# Patient Record
Sex: FEMALE | Race: WHITE | NOT HISPANIC OR LATINO | URBAN - METROPOLITAN AREA
[De-identification: names, ages, dates, MRNs, and addresses within clinical notes are randomized per-mention and may not be internally consistent; named-entity substitution may affect disease eponyms.]

---

## 2017-02-14 ENCOUNTER — OFFICE (AMBULATORY)
Dept: URBAN - METROPOLITAN AREA CLINIC 75 | Facility: CLINIC | Age: 27
End: 2017-02-14

## 2017-02-14 ENCOUNTER — OFFICE (AMBULATORY)
Dept: URBAN - METROPOLITAN AREA CLINIC 75 | Facility: CLINIC | Age: 27
End: 2017-02-14
Payer: MEDICARE

## 2017-02-14 VITALS — HEIGHT: 65 IN

## 2017-02-14 VITALS
HEART RATE: 100 BPM | HEIGHT: 65 IN | DIASTOLIC BLOOD PRESSURE: 78 MMHG | SYSTOLIC BLOOD PRESSURE: 122 MMHG | WEIGHT: 157 LBS

## 2017-02-14 DIAGNOSIS — K63.3 ULCER OF INTESTINE: ICD-10-CM

## 2017-02-14 DIAGNOSIS — T81.4XXS INFECTION FOLLOWING A PROCEDURE, SEQUELA: ICD-10-CM

## 2017-02-14 DIAGNOSIS — K50.00 CROHN'S DISEASE OF SMALL INTESTINE WITHOUT COMPLICATIONS: ICD-10-CM

## 2017-02-14 DIAGNOSIS — R53.83 OTHER FATIGUE: ICD-10-CM

## 2017-02-14 DIAGNOSIS — Z00.00 ENCOUNTER FOR GENERAL ADULT MEDICAL EXAMINATION WITHOUT ABNO: ICD-10-CM

## 2017-02-14 DIAGNOSIS — R10.10 UPPER ABDOMINAL PAIN, UNSPECIFIED: ICD-10-CM

## 2017-02-14 DIAGNOSIS — Z79.899 OTHER LONG TERM (CURRENT) DRUG THERAPY: ICD-10-CM

## 2017-02-14 DIAGNOSIS — R11.0 NAUSEA: ICD-10-CM

## 2017-02-14 DIAGNOSIS — K50.013 CROHN'S DISEASE OF SMALL INTESTINE WITH FISTULA: ICD-10-CM

## 2017-02-14 DIAGNOSIS — Z92.25 PERSONAL HISTORY OF IMMUNOSUPPRESSION THERAPY: ICD-10-CM

## 2017-02-14 DIAGNOSIS — R14.0 ABDOMINAL DISTENSION (GASEOUS): ICD-10-CM

## 2017-02-14 PROCEDURE — 99214 OFFICE O/P EST MOD 30 MIN: CPT | Performed by: INTERNAL MEDICINE

## 2017-02-14 RX ORDER — VEDOLIZUMAB 300 MG/5ML
INJECTION, POWDER, LYOPHILIZED, FOR SOLUTION INTRAVENOUS
Qty: 1 | Refills: 3 | Status: COMPLETED
End: 2018-06-05

## 2017-02-14 RX ORDER — CYANOCOBALAMIN 1000 UG/ML
INJECTION, SOLUTION INTRAMUSCULAR
Qty: 6 | Refills: 6 | Status: ACTIVE

## 2017-07-03 ENCOUNTER — OFFICE (AMBULATORY)
Dept: URBAN - METROPOLITAN AREA CLINIC 75 | Facility: CLINIC | Age: 27
End: 2017-07-03

## 2017-07-03 VITALS
DIASTOLIC BLOOD PRESSURE: 62 MMHG | SYSTOLIC BLOOD PRESSURE: 102 MMHG | WEIGHT: 165 LBS | HEIGHT: 65 IN | HEART RATE: 97 BPM

## 2017-07-03 DIAGNOSIS — R14.0 ABDOMINAL DISTENSION (GASEOUS): ICD-10-CM

## 2017-07-03 DIAGNOSIS — K50.013 CROHN'S DISEASE OF SMALL INTESTINE WITH FISTULA: ICD-10-CM

## 2017-07-03 DIAGNOSIS — R10.10 UPPER ABDOMINAL PAIN, UNSPECIFIED: ICD-10-CM

## 2017-07-03 DIAGNOSIS — R11.0 NAUSEA: ICD-10-CM

## 2017-07-03 DIAGNOSIS — D51.9 VITAMIN B12 DEFICIENCY ANEMIA, UNSPECIFIED: ICD-10-CM

## 2017-07-03 DIAGNOSIS — K63.3 ULCER OF INTESTINE: ICD-10-CM

## 2017-07-03 DIAGNOSIS — R53.83 OTHER FATIGUE: ICD-10-CM

## 2017-07-03 DIAGNOSIS — K59.1 FUNCTIONAL DIARRHEA: ICD-10-CM

## 2017-07-03 DIAGNOSIS — Z92.25 PERSONAL HISTORY OF IMMUNOSUPPRESSION THERAPY: ICD-10-CM

## 2017-07-03 DIAGNOSIS — K50.90 CROHN'S DISEASE, UNSPECIFIED, WITHOUT COMPLICATIONS: ICD-10-CM

## 2017-07-03 PROCEDURE — 99215 OFFICE O/P EST HI 40 MIN: CPT | Performed by: INTERNAL MEDICINE

## 2017-07-03 NOTE — SERVICEHPINOTES
08/03/16... She'll return for followup. She's a quite complex case which included intra-abdominal abscess, drainage of abscess and millimeter stomata the left lower quadrant. Since she has lost 40 pounds she's having some difficulty with the stoma it may need to be revised again. She is having increased transit and notes of her output is greenish in color. She is having difficulty with loose bowel movements and uses a large amount of Imodium, sometimes much as 20 per day. I did reassure them as this was okay. She has been previously on hydrocodone and large amounts and states that she's not using this or fentanyl patch is a longer. She is off of prednisone as well. She denies any nausea or vomiting at this time she is able to eat. She does have some bloating and abdominal pain in the lower abdomen along her incision. Surgery and followup in done by . Records have been reviewed.Pertinent positive symptoms include fatigue, loss of appetite, weight loss, abdominal pain, bloating, change in bowel habits, painful stools. 11/1/16... We have followed the patient for Crohns ileocolitis. Disease course has been flaring since last visit. Currently on treatment with Entyvio and Prednisone. Baseline IBD symptoms have worsened since last visit. The patient has not been doing well overall. Currently having bowel movement(s) per day. Active GI symptoms include normally functioning ileostomy but it appears to be a possible fistula just lateral to the stoma which is now apparently passing stool continuously and bubbling. They are considered by the patient to be severe in nature. Alarm symptoms reported: fever/chills. The patient also reports the following potential extraintestinal symptom manifestations: apparent fistula or continued peristomal abscess medially. Symptoms have recently caused the patient to take pain medication and we had a discussion about the fact that pain medication not going to resolve her symptoms as a have been over used in the past and we are going to try and use medication to healed fistulous tract.. [facility] review shows that she is filling Hydrocodone regularly most recently with Dr. Banegas and Dr. Steinberg.2/14/17..... Liliana returns for followup. Overall she's doing reasonably well and we had a long discussion about her B12 injections. Levels have been fairly high greater than 1000, with celiac disease and monthly but I recommended that she continue this for the time being and that we mightBRShe while in the interval in the future. Further big issue is that she has continued drainage from the fistulous tract in the inferomedial position from her stoma. She is concerned because Dr. Jay recommended surgery which will require moving her stoma to the right side. This is in hopes that the fistula can be resolved. We also discussed the fairly real therapy available would be to increase her Entyvio dosing. We have followed the patient for Crohns ileocolitis. Disease course has been stable on biologic therapy with immunomodulators but still with fistulous drainage near the stoma on the right. Currently on treatment with Entyvio. Baseline IBD symptoms have remain the same since last visit. The patient has been improving overall. Currently having bowel movement(s) per day. Active GI symptoms include LLQ pain referable to her stoma and the fistula. They are considered by the patient to be mild in nature. Alarm symptoms reported: none. The patient also reports the following potential extraintestinal symptom manifestations: none. Symptoms have recently caused the patient to change nothing. she is currently also taking codeine 3 times daily to thicken in her bowel movements with a reasonable response7/3/17... We have followed the patient for   Crohns ileocolitis  . Disease course has been   stable on biologic therapy  . Currently on treatment with   Entyvio every 4 weeks (increased interval starting in April)  . Baseline IBD symptoms have   remain the same  since last visit. The patient      has been doing fair  overall. Active GI symptoms include   LLQ pain and increased output  . They are considered by the patient to be   mild   in nature. Alarm symptoms reported:   none  . The patient also reports the following potential extraintestinal symptom manifestations:   none  . Symptoms have recently caused the patient to   file for disability  . She did have her stoma moved in March and she is doing better since then. She did have some setbacks immediately after the surgery. She c/o sharp LLQ pain which is worse with stress and anxiety but no change with food or output. She c/o excessive output from her ostomy and has tried loperamide (10+ a day) and codeine BID which hasn't really help. She states that Dr. Jay wanted some stool studies and asks us to do this. She also c/o sweats/fatigue x 1 year and wonders what she can do about that. She also asks about seeing a nutritionist as she eats mostly carbs as this is what helps her output however it is causing weight gain. Her weight gain concerns her as she is fearful about developing another hernia. 5/18/17 labs: CMP ALT 87, AST 43 CBC H/H 13.4/43.6.  6/2017 labs: B12 619 CMP nml Mg 1.8 Phos 3.2

## 2017-07-03 NOTE — SERVICENOTES
The above note was scribed by Dee Dee Razo PA-C. Dr. Keyes saw this patient and examined this patient while in the office.

## 2017-12-15 ENCOUNTER — APPOINTMENT (OUTPATIENT)
Dept: CARDIOLOGY | Facility: HOSPITAL | Age: 27
End: 2017-12-15

## 2017-12-15 ENCOUNTER — HOSPITAL ENCOUNTER (EMERGENCY)
Facility: HOSPITAL | Age: 27
Discharge: HOME | End: 2017-12-15
Attending: EMERGENCY MEDICINE | Admitting: EMERGENCY MEDICINE

## 2017-12-15 VITALS
HEART RATE: 109 BPM | TEMPERATURE: 98.4 F | DIASTOLIC BLOOD PRESSURE: 86 MMHG | HEIGHT: 65 IN | SYSTOLIC BLOOD PRESSURE: 140 MMHG | RESPIRATION RATE: 18 BRPM | OXYGEN SATURATION: 96 % | BODY MASS INDEX: 28.32 KG/M2 | WEIGHT: 170 LBS

## 2017-12-15 DIAGNOSIS — N39.0 ACUTE UTI: Primary | ICD-10-CM

## 2017-12-15 DIAGNOSIS — M79.10 MYALGIA: ICD-10-CM

## 2017-12-15 DIAGNOSIS — E86.0 MILD DEHYDRATION: ICD-10-CM

## 2017-12-15 LAB
ALBUMIN SERPL-MCNC: 4.7 G/DL (ref 3.5–5.2)
ALBUMIN/GLOB SERPL: 1.2 G/DL
ALP SERPL-CCNC: 108 U/L (ref 39–117)
ALT SERPL W P-5'-P-CCNC: 70 U/L (ref 1–33)
ANION GAP SERPL CALCULATED.3IONS-SCNC: 13.7 MMOL/L
AST SERPL-CCNC: 27 U/L (ref 1–32)
BACTERIA UR QL AUTO: ABNORMAL /HPF
BASOPHILS # BLD AUTO: 0.03 10*3/MM3 (ref 0–0.2)
BASOPHILS NFR BLD AUTO: 0.2 % (ref 0–1.5)
BH CV LOWER VASCULAR LEFT COMMON FEMORAL AUGMENT: NORMAL
BH CV LOWER VASCULAR LEFT COMMON FEMORAL COMPETENT: NORMAL
BH CV LOWER VASCULAR LEFT COMMON FEMORAL COMPRESS: NORMAL
BH CV LOWER VASCULAR LEFT COMMON FEMORAL PHASIC: NORMAL
BH CV LOWER VASCULAR LEFT COMMON FEMORAL SPONT: NORMAL
BH CV LOWER VASCULAR LEFT DISTAL FEMORAL COMPRESS: NORMAL
BH CV LOWER VASCULAR LEFT GASTRONEMIUS COMPRESS: NORMAL
BH CV LOWER VASCULAR LEFT GREATER SAPH AK COMPRESS: NORMAL
BH CV LOWER VASCULAR LEFT GREATER SAPH BK COMPRESS: NORMAL
BH CV LOWER VASCULAR LEFT LESSER SAPH COMPRESS: NORMAL
BH CV LOWER VASCULAR LEFT MID FEMORAL AUGMENT: NORMAL
BH CV LOWER VASCULAR LEFT MID FEMORAL COMPETENT: NORMAL
BH CV LOWER VASCULAR LEFT MID FEMORAL COMPRESS: NORMAL
BH CV LOWER VASCULAR LEFT MID FEMORAL PHASIC: NORMAL
BH CV LOWER VASCULAR LEFT MID FEMORAL SPONT: NORMAL
BH CV LOWER VASCULAR LEFT PERONEAL COMPRESS: NORMAL
BH CV LOWER VASCULAR LEFT POPLITEAL AUGMENT: NORMAL
BH CV LOWER VASCULAR LEFT POPLITEAL COMPETENT: NORMAL
BH CV LOWER VASCULAR LEFT POPLITEAL COMPRESS: NORMAL
BH CV LOWER VASCULAR LEFT POPLITEAL PHASIC: NORMAL
BH CV LOWER VASCULAR LEFT POPLITEAL SPONT: NORMAL
BH CV LOWER VASCULAR LEFT POSTERIOR TIBIAL COMPRESS: NORMAL
BH CV LOWER VASCULAR LEFT PROXIMAL FEMORAL COMPRESS: NORMAL
BH CV LOWER VASCULAR LEFT SAPHENOFEMORAL JUNCTION AUGMENT: NORMAL
BH CV LOWER VASCULAR LEFT SAPHENOFEMORAL JUNCTION COMPRESS: NORMAL
BH CV LOWER VASCULAR LEFT SAPHENOFEMORAL JUNCTION PHASIC: NORMAL
BH CV LOWER VASCULAR LEFT SAPHENOFEMORAL JUNCTION SPONT: NORMAL
BH CV LOWER VASCULAR RIGHT COMMON FEMORAL AUGMENT: NORMAL
BH CV LOWER VASCULAR RIGHT COMMON FEMORAL COMPETENT: NORMAL
BH CV LOWER VASCULAR RIGHT COMMON FEMORAL COMPRESS: NORMAL
BH CV LOWER VASCULAR RIGHT COMMON FEMORAL PHASIC: NORMAL
BH CV LOWER VASCULAR RIGHT COMMON FEMORAL SPONT: NORMAL
BH CV LOWER VASCULAR RIGHT DISTAL FEMORAL COMPRESS: NORMAL
BH CV LOWER VASCULAR RIGHT GASTRONEMIUS COMPRESS: NORMAL
BH CV LOWER VASCULAR RIGHT GREATER SAPH AK COMPRESS: NORMAL
BH CV LOWER VASCULAR RIGHT GREATER SAPH BK COMPRESS: NORMAL
BH CV LOWER VASCULAR RIGHT LESSER SAPH COMPRESS: NORMAL
BH CV LOWER VASCULAR RIGHT MID FEMORAL AUGMENT: NORMAL
BH CV LOWER VASCULAR RIGHT MID FEMORAL COMPETENT: NORMAL
BH CV LOWER VASCULAR RIGHT MID FEMORAL COMPRESS: NORMAL
BH CV LOWER VASCULAR RIGHT MID FEMORAL PHASIC: NORMAL
BH CV LOWER VASCULAR RIGHT MID FEMORAL SPONT: NORMAL
BH CV LOWER VASCULAR RIGHT PERONEAL COMPRESS: NORMAL
BH CV LOWER VASCULAR RIGHT POPLITEAL AUGMENT: NORMAL
BH CV LOWER VASCULAR RIGHT POPLITEAL COMPETENT: NORMAL
BH CV LOWER VASCULAR RIGHT POPLITEAL COMPRESS: NORMAL
BH CV LOWER VASCULAR RIGHT POPLITEAL PHASIC: NORMAL
BH CV LOWER VASCULAR RIGHT POPLITEAL SPONT: NORMAL
BH CV LOWER VASCULAR RIGHT POSTERIOR TIBIAL COMPRESS: NORMAL
BH CV LOWER VASCULAR RIGHT PROXIMAL FEMORAL COMPRESS: NORMAL
BH CV LOWER VASCULAR RIGHT SAPHENOFEMORAL JUNCTION AUGMENT: NORMAL
BH CV LOWER VASCULAR RIGHT SAPHENOFEMORAL JUNCTION COMPRESS: NORMAL
BH CV LOWER VASCULAR RIGHT SAPHENOFEMORAL JUNCTION PHASIC: NORMAL
BH CV LOWER VASCULAR RIGHT SAPHENOFEMORAL JUNCTION SPONT: NORMAL
BILIRUB SERPL-MCNC: 0.4 MG/DL (ref 0.1–1.2)
BILIRUB UR QL STRIP: NEGATIVE
BUN BLD-MCNC: 9 MG/DL (ref 6–20)
BUN/CREAT SERPL: 12.3 (ref 7–25)
CALCIUM SPEC-SCNC: 9.9 MG/DL (ref 8.6–10.5)
CHLORIDE SERPL-SCNC: 103 MMOL/L (ref 98–107)
CK SERPL-CCNC: 21 U/L (ref 20–180)
CLARITY UR: ABNORMAL
CO2 SERPL-SCNC: 26.3 MMOL/L (ref 22–29)
COLOR UR: YELLOW
CREAT BLD-MCNC: 0.73 MG/DL (ref 0.57–1)
DEPRECATED RDW RBC AUTO: 51.4 FL (ref 37–54)
EOSINOPHIL # BLD AUTO: 0.12 10*3/MM3 (ref 0–0.7)
EOSINOPHIL NFR BLD AUTO: 0.9 % (ref 0.3–6.2)
ERYTHROCYTE [DISTWIDTH] IN BLOOD BY AUTOMATED COUNT: 15.1 % (ref 11.7–13)
GFR SERPL CREATININE-BSD FRML MDRD: 96 ML/MIN/1.73
GLOBULIN UR ELPH-MCNC: 4 GM/DL
GLUCOSE BLD-MCNC: 78 MG/DL (ref 65–99)
GLUCOSE UR STRIP-MCNC: NEGATIVE MG/DL
HCT VFR BLD AUTO: 52.1 % (ref 35.6–45.5)
HGB BLD-MCNC: 16.2 G/DL (ref 11.9–15.5)
HGB UR QL STRIP.AUTO: NEGATIVE
HYALINE CASTS UR QL AUTO: ABNORMAL /LPF
IMM GRANULOCYTES # BLD: 0.08 10*3/MM3 (ref 0–0.03)
IMM GRANULOCYTES NFR BLD: 0.6 % (ref 0–0.5)
KETONES UR QL STRIP: NEGATIVE
LEUKOCYTE ESTERASE UR QL STRIP.AUTO: ABNORMAL
LYMPHOCYTES # BLD AUTO: 3.09 10*3/MM3 (ref 0.9–4.8)
LYMPHOCYTES NFR BLD AUTO: 23.6 % (ref 19.6–45.3)
MCH RBC QN AUTO: 29 PG (ref 26.9–32)
MCHC RBC AUTO-ENTMCNC: 31.1 G/DL (ref 32.4–36.3)
MCV RBC AUTO: 93.4 FL (ref 80.5–98.2)
MONOCYTES # BLD AUTO: 1.55 10*3/MM3 (ref 0.2–1.2)
MONOCYTES NFR BLD AUTO: 11.8 % (ref 5–12)
NEUTROPHILS # BLD AUTO: 8.24 10*3/MM3 (ref 1.9–8.1)
NEUTROPHILS NFR BLD AUTO: 62.9 % (ref 42.7–76)
NITRITE UR QL STRIP: NEGATIVE
PH UR STRIP.AUTO: 5.5 [PH] (ref 5–8)
PLATELET # BLD AUTO: 341 10*3/MM3 (ref 140–500)
PMV BLD AUTO: 9.2 FL (ref 6–12)
POTASSIUM BLD-SCNC: 3.6 MMOL/L (ref 3.5–5.2)
PROT SERPL-MCNC: 8.7 G/DL (ref 6–8.5)
PROT UR QL STRIP: NEGATIVE
RBC # BLD AUTO: 5.58 10*6/MM3 (ref 3.9–5.2)
RBC # UR: ABNORMAL /HPF
REF LAB TEST METHOD: ABNORMAL
SODIUM BLD-SCNC: 143 MMOL/L (ref 136–145)
SP GR UR STRIP: 1.01 (ref 1–1.03)
SQUAMOUS #/AREA URNS HPF: ABNORMAL /HPF
UROBILINOGEN UR QL STRIP: ABNORMAL
WBC NRBC COR # BLD: 13.11 10*3/MM3 (ref 4.5–10.7)
WBC UR QL AUTO: ABNORMAL /HPF

## 2017-12-15 PROCEDURE — 96375 TX/PRO/DX INJ NEW DRUG ADDON: CPT

## 2017-12-15 PROCEDURE — 81001 URINALYSIS AUTO W/SCOPE: CPT | Performed by: NURSE PRACTITIONER

## 2017-12-15 PROCEDURE — 25010000002 CEFTRIAXONE PER 250 MG: Performed by: NURSE PRACTITIONER

## 2017-12-15 PROCEDURE — 96366 THER/PROPH/DIAG IV INF ADDON: CPT

## 2017-12-15 PROCEDURE — 80053 COMPREHEN METABOLIC PANEL: CPT | Performed by: NURSE PRACTITIONER

## 2017-12-15 PROCEDURE — 82550 ASSAY OF CK (CPK): CPT | Performed by: NURSE PRACTITIONER

## 2017-12-15 PROCEDURE — 96365 THER/PROPH/DIAG IV INF INIT: CPT

## 2017-12-15 PROCEDURE — 99284 EMERGENCY DEPT VISIT MOD MDM: CPT

## 2017-12-15 PROCEDURE — 85025 COMPLETE CBC W/AUTO DIFF WBC: CPT | Performed by: NURSE PRACTITIONER

## 2017-12-15 PROCEDURE — 93970 EXTREMITY STUDY: CPT

## 2017-12-15 PROCEDURE — 87086 URINE CULTURE/COLONY COUNT: CPT | Performed by: NURSE PRACTITIONER

## 2017-12-15 PROCEDURE — 25010000002 HYDROMORPHONE PER 4 MG: Performed by: NURSE PRACTITIONER

## 2017-12-15 RX ORDER — CEFTRIAXONE SODIUM 1 G/50ML
1 INJECTION, SOLUTION INTRAVENOUS ONCE
Status: COMPLETED | OUTPATIENT
Start: 2017-12-15 | End: 2017-12-15

## 2017-12-15 RX ORDER — OXYCODONE HYDROCHLORIDE AND ACETAMINOPHEN 5; 325 MG/1; MG/1
1 TABLET ORAL ONCE
Status: COMPLETED | OUTPATIENT
Start: 2017-12-15 | End: 2017-12-15

## 2017-12-15 RX ORDER — PREDNISONE MICRONIZED 100 %
POWDER (GRAM) MISCELLANEOUS
COMMUNITY
End: 2019-07-23

## 2017-12-15 RX ORDER — ONDANSETRON 4 MG/1
4 TABLET, ORALLY DISINTEGRATING ORAL EVERY 8 HOURS PRN
COMMUNITY

## 2017-12-15 RX ORDER — ALPRAZOLAM 0.25 MG/1
0.25 TABLET ORAL 3 TIMES DAILY PRN
COMMUNITY
End: 2019-07-23

## 2017-12-15 RX ORDER — ONDANSETRON 4 MG/1
4 TABLET, ORALLY DISINTEGRATING ORAL ONCE
Status: COMPLETED | OUTPATIENT
Start: 2017-12-15 | End: 2017-12-15

## 2017-12-15 RX ORDER — SODIUM CHLORIDE 0.9 % (FLUSH) 0.9 %
10 SYRINGE (ML) INJECTION AS NEEDED
Status: DISCONTINUED | OUTPATIENT
Start: 2017-12-15 | End: 2017-12-15 | Stop reason: HOSPADM

## 2017-12-15 RX ORDER — CEPHALEXIN 500 MG/1
500 CAPSULE ORAL 4 TIMES DAILY
Qty: 20 CAPSULE | Refills: 0 | Status: SHIPPED | OUTPATIENT
Start: 2017-12-15 | End: 2019-07-23

## 2017-12-15 RX ORDER — FERROUS SULFATE 325(65) MG
325 TABLET ORAL
COMMUNITY

## 2017-12-15 RX ORDER — CETIRIZINE HYDROCHLORIDE 10 MG/1
10 TABLET ORAL DAILY
COMMUNITY
End: 2019-07-23 | Stop reason: SDUPTHER

## 2017-12-15 RX ORDER — HYDROMORPHONE HYDROCHLORIDE 1 MG/ML
0.5 INJECTION, SOLUTION INTRAMUSCULAR; INTRAVENOUS; SUBCUTANEOUS ONCE
Status: COMPLETED | OUTPATIENT
Start: 2017-12-15 | End: 2017-12-15

## 2017-12-15 RX ADMIN — OXYCODONE HYDROCHLORIDE AND ACETAMINOPHEN 1 TABLET: 5; 325 TABLET ORAL at 11:20

## 2017-12-15 RX ADMIN — HYDROMORPHONE HYDROCHLORIDE 0.5 MG: 1 INJECTION, SOLUTION INTRAMUSCULAR; INTRAVENOUS; SUBCUTANEOUS at 14:29

## 2017-12-15 RX ADMIN — CEFTRIAXONE SODIUM 1 G: 1 INJECTION, SOLUTION INTRAVENOUS at 12:14

## 2017-12-15 RX ADMIN — SODIUM CHLORIDE 1000 ML: 9 INJECTION, SOLUTION INTRAVENOUS at 12:14

## 2017-12-15 RX ADMIN — ONDANSETRON 4 MG: 4 TABLET, ORALLY DISINTEGRATING ORAL at 11:19

## 2017-12-15 NOTE — ED PROVIDER NOTES
I supervised care provided by the midlevel provider.    We have discussed this patient's history, physical exam, and treatment plan.   I have reviewed the note and personally saw and examined the patient and agree with the plan of care.    Pt reports that she has h/o Crohn's Disease for which pt has an ostomy in place. 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 Pt states that about 4-5 days ago, pt developed myalgias of the BLEs. Pt states that she is concerned that her myalgias are an adverse reaction to the steroid injections. Pt denies documented fever, dyspnea, chest pain, and BLE weakness/numbness. On physical exam, there is mild tenderness to the calves bilaterally. There is no swelling of the calves bilaterally. Pt has been administered Percocet and Zofran in the ER. BLE venous dopplers are negative for DVT. Pt was discharged and was advised to f/u with general surgeon.                   Documentation assistance provided by Augusta Weiner. Information recorded by the scribe was done at my direction and has been verified and validated by me.     Entered by Augusta Weiner, acting as scribe for Dr. Amelia MD.             Augusta Weiner  12/15/17 2317       Tristan Cisneros MD  12/15/17 2729

## 2017-12-15 NOTE — DISCHARGE INSTRUCTIONS
Continue current home medications  Increase fluids  Activity as tolerated  Warm compresses to legs  Follow up with PMD and Dr Lares next week  Return to er for fever, chills, chest pain, shortness of air, redness or swelling to legs, increased pain or any new or worsening symptoms

## 2017-12-15 NOTE — ED PROVIDER NOTES
EMERGENCY DEPARTMENT ENCOUNTER    CHIEF COMPLAINT  Chief Complaint: myalgias   History given by: patient  History limited by: N/A  Room Number: 25/25  PMD: Provider Not In System   General surgeon- Dr Lares       HPI:  Pt is a 27 y.o. female who presents with myalgias. She states that she has Crohn's disease with ileostomy in place. 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 For approximately the last 4 days, she has had BLE pain that is exacerbated by movement. She denies recent injury or trauma, sensory loss, motor loss, pain and difficulty with urination, back pain, fevers, chills, cough, N/V/D, abd pain, chest pain, and trouble breathing. She is concerned that her sx could be due to adverse reaction from receiving the prednisone injections. Pt notes that she has been taking tylenol for pain without significant relief. She also reports that she has not had increased drainage from her ileostomy and has no complaints about her pyoderma gangrenosum or Crohn's disease. Past Medical History of Crohn's disease, chronic pain syndrome, and anxiety.      Duration: for about 4 days   Timing: intermittent  Location: BLE  Radiation: none  Quality: pain  Intensity/Severity: moderate  Progression: unchanged  Associated Symptoms: BLE pain  Aggravating Factors: movement  Alleviating Factors: none  Previous Episodes: none mentioned  Treatment before arrival: Pt reports that she has been taking tylenol for pain without significant relief.     PAST MEDICAL HISTORY  Active Ambulatory Problems     Diagnosis Date Noted   • No Active Ambulatory Problems     Resolved Ambulatory Problems     Diagnosis Date Noted   • No Resolved Ambulatory Problems     Past Medical History:   Diagnosis Date   • Anxiety    • Crohn's disease        PAST SURGICAL HISTORY  History reviewed. No pertinent surgical  history.    FAMILY HISTORY  History reviewed. No pertinent family history.    SOCIAL HISTORY  Social History     Social History   • Marital status: Single     Spouse name: N/A   • Number of children: N/A   • Years of education: N/A     Occupational History   • Not on file.     Social History Main Topics   • Smoking status: Never Smoker   • Smokeless tobacco: Not on file   • Alcohol use No   • Drug use: Not on file   • Sexual activity: Not on file     Other Topics Concern   • Not on file     Social History Narrative   • No narrative on file         ALLERGIES  Bentyl [dicyclomine hcl]; Ciprofloxacin; Flagyl [metronidazole]; Morphine and related; Mycophenolate; Pepcid [famotidine]; Phenergan [promethazine hcl]; Reglan [metoclopramide]; and Sulfa antibiotics    REVIEW OF SYSTEMS  Review of Systems   Constitutional: Negative for chills and fever.   HENT: Negative for sore throat.    Respiratory: Negative for cough and shortness of breath.    Cardiovascular: Negative for chest pain.   Gastrointestinal: Negative for abdominal pain, diarrhea, nausea and vomiting.   Genitourinary: Negative for difficulty urinating and dysuria.   Musculoskeletal: Positive for myalgias (BLE pain). Negative for back pain.   Skin: Negative for rash.   Neurological: Negative for dizziness, weakness and numbness.   Psychiatric/Behavioral: The patient is not nervous/anxious.        PHYSICAL EXAM  ED Triage Vitals   Temp Heart Rate Resp BP SpO2   12/15/17 1033 12/15/17 1033 12/15/17 1033 12/15/17 1043 12/15/17 1033   98.4 °F (36.9 °C) 108 18 148/113 99 % WNL       Physical Exam   Constitutional: She is oriented to person, place, and time and well-developed, well-nourished, and in no distress.   HENT:   Head: Normocephalic.   Mouth/Throat: Mucous membranes are normal.   Eyes: No scleral icterus.   Neck: Normal range of motion.   Cardiovascular: Normal rate, regular rhythm and normal heart sounds.    Pulses:       Dorsalis pedis pulses are 2+ on the  right side, and 2+ on the left side.        Posterior tibial pulses are 2+ on the right side, and 2+ on the left side.   Pulmonary/Chest: Effort normal and breath sounds normal. No respiratory distress.   Abdominal: Soft. There is no tenderness. There is no rebound and no guarding.   Ileostomy present in right abdomen    Musculoskeletal: Normal range of motion. She exhibits no edema (no pedal edema).   BLE tenderness, NV intact distally to BLE   Neurological: She is alert and oriented to person, place, and time. She has normal motor skills and normal sensation.   Skin: Skin is warm and dry.   Psychiatric: Her mood appears anxious.   Nursing note and vitals reviewed.      LAB RESULTS  Recent Results (from the past 24 hour(s))   Comprehensive Metabolic Panel    Collection Time: 12/15/17 11:06 AM   Result Value Ref Range    Glucose 78 65 - 99 mg/dL    BUN 9 6 - 20 mg/dL    Creatinine 0.73 0.57 - 1.00 mg/dL    Sodium 143 136 - 145 mmol/L    Potassium 3.6 3.5 - 5.2 mmol/L    Chloride 103 98 - 107 mmol/L    CO2 26.3 22.0 - 29.0 mmol/L    Calcium 9.9 8.6 - 10.5 mg/dL    Total Protein 8.7 (H) 6.0 - 8.5 g/dL    Albumin 4.70 3.50 - 5.20 g/dL    ALT (SGPT) 70 (H) 1 - 33 U/L    AST (SGOT) 27 1 - 32 U/L    Alkaline Phosphatase 108 39 - 117 U/L    Total Bilirubin 0.4 0.1 - 1.2 mg/dL    eGFR Non African Amer 96 >60 mL/min/1.73    Globulin 4.0 gm/dL    A/G Ratio 1.2 g/dL    BUN/Creatinine Ratio 12.3 7.0 - 25.0    Anion Gap 13.7 mmol/L   CBC Auto Differential    Collection Time: 12/15/17 11:06 AM   Result Value Ref Range    WBC 13.11 (H) 4.50 - 10.70 10*3/mm3    RBC 5.58 (H) 3.90 - 5.20 10*6/mm3    Hemoglobin 16.2 (H) 11.9 - 15.5 g/dL    Hematocrit 52.1 (H) 35.6 - 45.5 %    MCV 93.4 80.5 - 98.2 fL    MCH 29.0 26.9 - 32.0 pg    MCHC 31.1 (L) 32.4 - 36.3 g/dL    RDW 15.1 (H) 11.7 - 13.0 %    RDW-SD 51.4 37.0 - 54.0 fl    MPV 9.2 6.0 - 12.0 fL    Platelets 341 140 - 500 10*3/mm3    Neutrophil % 62.9 42.7 - 76.0 %    Lymphocyte % 23.6  19.6 - 45.3 %    Monocyte % 11.8 5.0 - 12.0 %    Eosinophil % 0.9 0.3 - 6.2 %    Basophil % 0.2 0.0 - 1.5 %    Immature Grans % 0.6 (H) 0.0 - 0.5 %    Neutrophils, Absolute 8.24 (H) 1.90 - 8.10 10*3/mm3    Lymphocytes, Absolute 3.09 0.90 - 4.80 10*3/mm3    Monocytes, Absolute 1.55 (H) 0.20 - 1.20 10*3/mm3    Eosinophils, Absolute 0.12 0.00 - 0.70 10*3/mm3    Basophils, Absolute 0.03 0.00 - 0.20 10*3/mm3    Immature Grans, Absolute 0.08 (H) 0.00 - 0.03 10*3/mm3   CK    Collection Time: 12/15/17 11:06 AM   Result Value Ref Range    Creatine Kinase 21 20 - 180 U/L   Urinalysis With / Culture If Indicated - Urine, Clean Catch    Collection Time: 12/15/17 11:19 AM   Result Value Ref Range    Color, UA Yellow Yellow, Straw    Appearance, UA Cloudy (A) Clear    pH, UA 5.5 5.0 - 8.0    Specific Gravity, UA 1.012 1.005 - 1.030    Glucose, UA Negative Negative    Ketones, UA Negative Negative    Bilirubin, UA Negative Negative    Blood, UA Negative Negative    Protein, UA Negative Negative    Leuk Esterase, UA Moderate (2+) (A) Negative    Nitrite, UA Negative Negative    Urobilinogen, UA 0.2 E.U./dL 0.2 - 1.0 E.U./dL   Urinalysis, Microscopic Only - Urine, Clean Catch    Collection Time: 12/15/17 11:19 AM   Result Value Ref Range    RBC, UA 0-2 None Seen, 0-2 /HPF    WBC, UA 31-50 (A) None Seen, 0-2 /HPF    Bacteria, UA None Seen None Seen /HPF    Squamous Epithelial Cells, UA 0-2 None Seen, 0-2 /HPF    Hyaline Casts, UA 0-2 None Seen /LPF    Methodology Automated Microscopy    Duplex Venous Lower Extremity - Bilateral    Collection Time: 12/15/17  1:58 PM   Result Value Ref Range    Right Common Femoral Spont Y     Right Common Femoral Phasic Y     Right Common Femoral Augment Y     Right Common Femoral Competent Y     Right Common Femoral Compress C     Right Saphenofemoral Junction Spont Y     Right Saphenofemoral Junction Phasic Y     Right Saphenofemoral Junction Augment Y     Right Saphenofemoral Junction Compress C      Right Proximal Femoral Compress C     Right Mid Femoral Spont Y     Right Mid Femoral Phasic Y     Right Mid Femoral Augment Y     Right Mid Femoral Competent Y     Right Mid Femoral Compress C     Right Distal Femoral Compress C     Right Popliteal Spont Y     Right Popliteal Phasic Y     Right Popliteal Augment Y     Right Popliteal Competent Y     Right Popliteal Compress C     Right Posterior Tibial Compress C     Right Peroneal Compress C     Right GastronemiusSoleal Compress C     Right Greater Saph AK Compress C     Right Greater Saph BK Compress C     Right Lesser Saph Compress C     Left Common Femoral Spont Y     Left Common Femoral Phasic Y     Left Common Femoral Augment Y     Left Common Femoral Competent Y     Left Common Femoral Compress C     Left Saphenofemoral Junction Spont Y     Left Saphenofemoral Junction Phasic Y     Left Saphenofemoral Junction Augment Y     Left Saphenofemoral Junction Compress C     Left Proximal Femoral Compress C     Left Mid Femoral Spont Y     Left Mid Femoral Phasic Y     Left Mid Femoral Augment Y     Left Mid Femoral Competent Y     Left Mid Femoral Compress C     Left Distal Femoral Compress C     Left Popliteal Spont Y     Left Popliteal Phasic Y     Left Popliteal Augment Y     Left Popliteal Competent Y     Left Popliteal Compress C     Left Posterior Tibial Compress C     Left Peroneal Compress C     Left GastronemiusSoleal Compress C     Left Greater Saph AK Compress C     Left Greater Saph BK Compress C     Left Lesser Saph Compress C      BLE venous duplex- negative, no DVT    I ordered the above labs and reviewed the results. Spoke with vascular tech regarding BLE venous duplex scan results          MEDICAL RECORD REVIEW  On 12/9/17, WBC count was 14.44 and hgb was 16.5. On 11/7/17, WBC was 9.53 and hgb was 14.5.         PROGRESS AND CONSULTS  10:58 AM- Ordered percocet and zofran for pain.   11:00 AM- Reviewed pt's history and workup with Dr. Cisneros.   At bedside evaluation, they agree with the plan of care.  11:25 AM- Ordered CK for further evaluation.   11:57 AM- Ordered rocephin for UTI and IV fluids for hydration.   12:10 PM- Ordered BLE venous duplex to rule out DVT and for further evaluation.   2:10 PM- Pt states that she still has BLE pain and is requesting IV dilaudid (has been ordered).   2:13 PM- Rechecked pt. She is resting comfortably and is in no acute distress. Reviewed implications of results (including UTI indicated on UA, elevated hemoglobin suggestive of mild dehydration, normal CK, negative BLE venous duplex), diagnosis, meds, responsibility to follow up, warning signs and symptoms of possible worsening, potential complications and reasons to return to ER with patient.  Discussed all results and noted any abnormalities with patient.  Discussed absolute need to recheck abnormalities and condition with PMD and general surgeon. Informed pt of plan to prescribe abx for UTI. Advised pt to discuss with general surgeon about possibly discontinuing prednisone use. Instructed pt to well hydrate, to perform activity as tolerated, and to apply warm compresses to BLE.   Discussed plan for discharge, as there is no emergent indication for admission.  Pt is agreeable and understands need for follow up and repeat testing.  Pt is aware that discharge does not mean that nothing is wrong but it indicates no emergency is present.  Pt is discharged with instructions to follow up with primary care doctor to have their blood pressure rechecked.       DIAGNOSIS  Final diagnoses:   Acute UTI   Mild dehydration   Myalgia       FOLLOW UP   Virgen Lares MD  [address] [place] [address], [address] [place], [place] [id number] [phone]    Call in 3 days        RX     Medication List      cephalexin 500 MG capsule   Commonly known as:  KEFLEX   Take 1 capsule by mouth 4 (Four) Times a Day.       COURSE & MEDICAL DECISION MAKING  Pertinent Labs and Imaging studies that were ordered and  "reviewed are noted above.  Results were reviewed/discussed with the patient and they were also made aware of online assess.   Pt also made aware that some labs, such as cultures, will not be resulted during ER visit and follow up with PMD is necessary.     MEDICATIONS GIVEN IN ER  Medications   sodium chloride 0.9 % flush 10 mL (not administered)   HYDROmorphone (DILAUDID) injection 0.5 mg (not administered)   oxyCODONE-acetaminophen (PERCOCET) 5-325 MG per tablet 1 tablet (1 tablet Oral Given 12/15/17 1120)   ondansetron ODT (ZOFRAN-ODT) disintegrating tablet 4 mg (4 mg Oral Given 12/15/17 1119)   sodium chloride 0.9 % bolus 1,000 mL (1,000 mL Intravenous New Bag 12/15/17 1214)   cefTRIAXone (ROCEPHIN) IVPB 1 g (0 g Intravenous Stopped 12/15/17 1403)       /97  Pulse 109  Temp 98.4 °F (36.9 °C) (Tympanic)   Resp 18  Ht 165.1 cm (65\")  Wt 77.1 kg (170 lb)  SpO2 96%  BMI 28.29 kg/m2      I personally reviewed the past medical history, past surgical history, social history, family history, current medications and allergies as they appear in this chart.  The scribe's note accurately reflects the work and decisions made by me.     Documentation assistance provided by guerline Weiner for KATHIE Blank on 12/15/2017 at 2:18 PM. Information recorded by the scribe was done at my direction and has been verified and validated by me.        Mary Weiner  12/15/17 1427       JOSE Us  12/15/17 1434    "

## 2017-12-17 LAB
BACTERIA SPEC AEROBE CULT: NORMAL
BACTERIA SPEC AEROBE CULT: NORMAL

## 2018-01-30 ENCOUNTER — OFFICE (AMBULATORY)
Dept: URBAN - METROPOLITAN AREA CLINIC 75 | Facility: CLINIC | Age: 28
End: 2018-01-30
Payer: MEDICAID

## 2018-01-30 VITALS
DIASTOLIC BLOOD PRESSURE: 70 MMHG | HEIGHT: 65 IN | SYSTOLIC BLOOD PRESSURE: 120 MMHG | HEART RATE: 101 BPM | WEIGHT: 192 LBS

## 2018-01-30 DIAGNOSIS — R10.32 LEFT LOWER QUADRANT PAIN: ICD-10-CM

## 2018-01-30 DIAGNOSIS — Z93.9 ARTIFICIAL OPENING STATUS, UNSPECIFIED: ICD-10-CM

## 2018-01-30 DIAGNOSIS — L08.0 PYODERMA: ICD-10-CM

## 2018-01-30 DIAGNOSIS — Z79.899 OTHER LONG TERM (CURRENT) DRUG THERAPY: ICD-10-CM

## 2018-01-30 DIAGNOSIS — K50.90 CROHN'S DISEASE, UNSPECIFIED, WITHOUT COMPLICATIONS: ICD-10-CM

## 2018-01-30 PROCEDURE — 99215 OFFICE O/P EST HI 40 MIN: CPT | Performed by: INTERNAL MEDICINE

## 2018-01-30 RX ORDER — OMEPRAZOLE 40 MG/1
80 CAPSULE, DELAYED RELEASE ORAL
Qty: 180 | Refills: 3 | Status: COMPLETED
Start: 2016-11-14 | End: 2018-01-30

## 2018-01-30 RX ORDER — PANTOPRAZOLE SODIUM 40 MG/1
80 TABLET, DELAYED RELEASE ORAL
Qty: 180 | Refills: 3 | Status: COMPLETED
Start: 2018-01-30 | End: 2018-02-09

## 2018-01-30 NOTE — SERVICEHPINOTES
08/03/16... She'll return for followup. She's a quite complex case which included intra-abdominal abscess, drainage of abscess and millimeter stomata the left lower quadrant. Since she has lost 40 pounds she's having some difficulty with the stoma it may need to be revised again. She is having increased transit and notes of her output is greenish in color. She is having difficulty with loose bowel movements and uses a large amount of Imodium, sometimes much as 20 per day. I did reassure them as this was okay. She has been previously on hydrocodone and large amounts and states that she's not using this or fentanyl patch is a longer. She is off of prednisone as well. She denies any nausea or vomiting at this time she is able to eat. She does have some bloating and abdominal pain in the lower abdomen along her incision. Surgery and followup in done by . Records have been reviewed.Pertinent positive symptoms include fatigue, loss of appetite, weight loss, abdominal pain, bloating, change in bowel habits, painful stools. 11/1/16... We have followed the patient for Crohns ileocolitis. Disease course has been flaring since last visit. Currently on treatment with Entyvio and Prednisone. Baseline IBD symptoms have worsened since last visit. The patient has not been doing well overall. Currently having bowel movement(s) per day. Active GI symptoms include normally functioning ileostomy but it appears to be a possible fistula just lateral to the stoma which is now apparently passing stool continuously and bubbling. They are considered by the patient to be severe in nature. Alarm symptoms reported: fever/chills. The patient also reports the following potential extraintestinal symptom manifestations: apparent fistula or continued peristomal abscess medially. Symptoms have recently caused the patient to take pain medication and we had a discussion about the fact that pain medication not going to resolve her symptoms as a have been over used in the past and we are going to try and use medication to healed fistulous tract.. [facility] review shows that she is filling Hydrocodone regularly most recently with Dr. Banegas and Dr. Steinberg.2/14/17..... Liliana returns for followup. Overall she's doing reasonably well and we had a long discussion about her B12 injections. Levels have been fairly high greater than 1000, with celiac disease and monthly but I recommended that she continue this for the time being and that we mightBRShe while in the interval in the future. Further big issue is that she has continued drainage from the fistulous tract in the inferomedial position from her stoma. She is concerned because Dr. aJy recommended surgery which will require moving her stoma to the right side. This is in hopes that the fistula can be resolved. We also discussed the fairly real therapy available would be to increase her Entyvio dosing. We have followed the patient for Crohns ileocolitis. Disease course has been stable on biologic therapy with immunomodulators but still with fistulous drainage near the stoma on the right. Currently on treatment with Entyvio. Baseline IBD symptoms have remain the same since last visit. The patient has been improving overall. Currently having bowel movement(s) per day. Active GI symptoms include LLQ pain referable to her stoma and the fistula. They are considered by the patient to be mild in nature. Alarm symptoms reported: none. The patient also reports the following potential extraintestinal symptom manifestations: none. Symptoms have recently caused the patient to change nothing. she is currently also taking codeine 3 times daily to thicken in her bowel movements with a reasonable response7/3/17... We have followed the patient for Crohns ileocolitis. Disease course has been stable on biologic therapy. Currently on treatment with Entyvio every 4 weeks (increased interval starting in April). Baseline IBD symptoms have remain the same since last visit. The patient has been doing fair overall. Active GI symptoms include LLQ pain and increased output. They are considered by the patient to be mild in nature. Alarm symptoms reported: none. The patient also reports the following potential extraintestinal symptom manifestations: none. Symptoms have recently caused the patient to file for disability. She did have her stoma moved in March and she is doing better since then. She did have some setbacks immediately after the surgery. She c/o sharp LLQ pain which is worse with stress and anxiety but no change with food or output. She c/o excessive output from her ostomy and has tried loperamide (10+ a day) and codeine BID which hasn't really help. She states that Dr. Jay wanted some stool studies and asks us to do this. She also c/o sweats/fatigue x 1 year and wonders what she can do about that. She also asks about seeing a nutritionist as she eats mostly carbs as this is what helps her output however it is causing weight gain. Her weight gain concerns her as she is fearful about developing another hernia. 5/18/17 labs: CMP ALT 87, AST 43 CBC H/H 13.4/43.6. 6/2017 labs: B12 619 CMP nml Mg 1.8 Phos 3.2 1/30/2018... We have followed the patient for   Crohns ileocolitis  . Disease course has been   flaring since last visit  . Currently on treatment with   Entyvio and Tylenol #3 3-4 times a day for output Omeprazole 40mg BID  . Baseline IBD symptoms have   symptomatic  since last visit. The patient      has been doing fair  overall. Currently having   bowel movement(s) per day. Active GI symptoms include   occasional trace of blood in stool output worsening heartburn LLQ abdominal pain  . They are considered by the patient to be   mild   in nature. Alarm symptoms reported:   none  . The patient also reports the following potential extraintestinal symptom manifestations:   pyoderma around stoma--wound vac being started, 80mg Prednisone injected biweekly all by Dr. Jay Joint pain can be debilitating  . Symptoms have recently caused the patient to   avoid leaving home, excessive medical appointments and miss work  . 1/30/18 labs CRP 0.6 CBC WBC 11.10, H/H 15.2/48.1

## 2018-05-09 ENCOUNTER — OFFICE (AMBULATORY)
Dept: URBAN - METROPOLITAN AREA INFUSION 3 | Facility: INFUSION | Age: 28
End: 2018-05-09
Payer: MEDICAID

## 2018-05-09 VITALS
HEART RATE: 76 BPM | WEIGHT: 198 LBS | HEIGHT: 65 IN | DIASTOLIC BLOOD PRESSURE: 79 MMHG | SYSTOLIC BLOOD PRESSURE: 109 MMHG | RESPIRATION RATE: 20 BRPM | TEMPERATURE: 96.5 F | SYSTOLIC BLOOD PRESSURE: 103 MMHG | DIASTOLIC BLOOD PRESSURE: 78 MMHG | SYSTOLIC BLOOD PRESSURE: 119 MMHG | DIASTOLIC BLOOD PRESSURE: 70 MMHG | TEMPERATURE: 97.3 F | HEART RATE: 78 BPM | RESPIRATION RATE: 22 BRPM

## 2018-05-09 DIAGNOSIS — K50.90 CROHN'S DISEASE, UNSPECIFIED, WITHOUT COMPLICATIONS: ICD-10-CM

## 2018-05-09 PROCEDURE — 96365 THER/PROPH/DIAG IV INF INIT: CPT | Performed by: INTERNAL MEDICINE

## 2018-06-05 ENCOUNTER — OFFICE (AMBULATORY)
Dept: URBAN - METROPOLITAN AREA CLINIC 75 | Facility: CLINIC | Age: 28
End: 2018-06-05

## 2018-06-05 VITALS
HEIGHT: 65 IN | HEART RATE: 98 BPM | WEIGHT: 186 LBS | SYSTOLIC BLOOD PRESSURE: 122 MMHG | DIASTOLIC BLOOD PRESSURE: 80 MMHG

## 2018-06-05 DIAGNOSIS — R10.32 LEFT LOWER QUADRANT PAIN: ICD-10-CM

## 2018-06-05 DIAGNOSIS — R11.0 NAUSEA: ICD-10-CM

## 2018-06-05 DIAGNOSIS — Z00.00 ENCOUNTER FOR GENERAL ADULT MEDICAL EXAMINATION WITHOUT ABNO: ICD-10-CM

## 2018-06-05 DIAGNOSIS — R53.83 OTHER FATIGUE: ICD-10-CM

## 2018-06-05 DIAGNOSIS — T81.4XXS INFECTION FOLLOWING A PROCEDURE, SEQUELA: ICD-10-CM

## 2018-06-05 DIAGNOSIS — Z79.899 OTHER LONG TERM (CURRENT) DRUG THERAPY: ICD-10-CM

## 2018-06-05 DIAGNOSIS — K50.013 CROHN'S DISEASE OF SMALL INTESTINE WITH FISTULA: ICD-10-CM

## 2018-06-05 DIAGNOSIS — K59.1 FUNCTIONAL DIARRHEA: ICD-10-CM

## 2018-06-05 DIAGNOSIS — L08.0 PYODERMA: ICD-10-CM

## 2018-06-05 DIAGNOSIS — D51.9 VITAMIN B12 DEFICIENCY ANEMIA, UNSPECIFIED: ICD-10-CM

## 2018-06-05 DIAGNOSIS — R14.0 ABDOMINAL DISTENSION (GASEOUS): ICD-10-CM

## 2018-06-05 DIAGNOSIS — R10.10 UPPER ABDOMINAL PAIN, UNSPECIFIED: ICD-10-CM

## 2018-06-05 PROCEDURE — 99215 OFFICE O/P EST HI 40 MIN: CPT | Mod: 25 | Performed by: INTERNAL MEDICINE

## 2018-06-05 PROCEDURE — 96372-B12 B-12 ADMINISTRATION CODE: Performed by: INTERNAL MEDICINE

## 2018-06-05 RX ORDER — ONDANSETRON HYDROCHLORIDE 8 MG/1
24 TABLET, FILM COATED ORAL
Qty: 90 | Refills: 4 | Status: COMPLETED
Start: 2018-02-09 | End: 2019-05-07

## 2018-06-05 RX ORDER — CYANOCOBALAMIN 1000 UG/ML
INJECTION, SOLUTION INTRAMUSCULAR
Qty: 6 | Refills: 6 | Status: ACTIVE

## 2018-10-30 ENCOUNTER — OFFICE (AMBULATORY)
Dept: URBAN - METROPOLITAN AREA CLINIC 75 | Facility: CLINIC | Age: 28
End: 2018-10-30

## 2018-10-30 VITALS
DIASTOLIC BLOOD PRESSURE: 82 MMHG | HEART RATE: 97 BPM | HEIGHT: 65 IN | SYSTOLIC BLOOD PRESSURE: 130 MMHG | WEIGHT: 181 LBS

## 2018-10-30 DIAGNOSIS — R10.10 UPPER ABDOMINAL PAIN, UNSPECIFIED: ICD-10-CM

## 2018-10-30 DIAGNOSIS — K12.1 OTHER FORMS OF STOMATITIS: ICD-10-CM

## 2018-10-30 DIAGNOSIS — D51.9 VITAMIN B12 DEFICIENCY ANEMIA, UNSPECIFIED: ICD-10-CM

## 2018-10-30 DIAGNOSIS — Z92.25 PERSONAL HISTORY OF IMMUNOSUPPRESSION THERAPY: ICD-10-CM

## 2018-10-30 DIAGNOSIS — K50.90 CROHN'S DISEASE, UNSPECIFIED, WITHOUT COMPLICATIONS: ICD-10-CM

## 2018-10-30 PROCEDURE — 99214 OFFICE O/P EST MOD 30 MIN: CPT | Performed by: INTERNAL MEDICINE

## 2018-10-30 RX ORDER — USTEKINUMAB 90 MG/ML
INJECTION, SOLUTION SUBCUTANEOUS
Qty: 1 | Refills: 7 | Status: COMPLETED
End: 2020-01-01

## 2018-10-30 RX ORDER — TRIAMCINOLONE ACETONIDE 1 MG/G
PASTE DENTAL
Qty: 5 | Refills: 1 | Status: ACTIVE

## 2019-01-22 ENCOUNTER — OFFICE (AMBULATORY)
Dept: URBAN - METROPOLITAN AREA CLINIC 75 | Facility: CLINIC | Age: 29
End: 2019-01-22
Payer: MEDICAID

## 2019-01-22 VITALS
WEIGHT: 181 LBS | HEIGHT: 65 IN | DIASTOLIC BLOOD PRESSURE: 70 MMHG | HEART RATE: 89 BPM | SYSTOLIC BLOOD PRESSURE: 120 MMHG

## 2019-01-22 DIAGNOSIS — K12.1 OTHER FORMS OF STOMATITIS: ICD-10-CM

## 2019-01-22 DIAGNOSIS — Z92.25 PERSONAL HISTORY OF IMMUNOSUPPRESSION THERAPY: ICD-10-CM

## 2019-01-22 DIAGNOSIS — R10.13 EPIGASTRIC PAIN: ICD-10-CM

## 2019-01-22 DIAGNOSIS — K50.013 CROHN'S DISEASE OF SMALL INTESTINE WITH FISTULA: ICD-10-CM

## 2019-01-22 DIAGNOSIS — Z83.71 FAMILY HISTORY OF COLONIC POLYPS: ICD-10-CM

## 2019-01-22 PROCEDURE — 99215 OFFICE O/P EST HI 40 MIN: CPT

## 2019-01-22 RX ORDER — PANTOPRAZOLE SODIUM 40 MG/1
80 TABLET, DELAYED RELEASE ORAL
Qty: 180 | Refills: 1 | Status: ACTIVE

## 2019-01-22 RX ORDER — OMEPRAZOLE 40 MG/1
80 CAPSULE, DELAYED RELEASE ORAL
Qty: 180 | Refills: 3 | Status: COMPLETED
Start: 2018-02-09 | End: 2019-01-22

## 2019-01-22 RX ORDER — DEXAMETHASONE 0.5 MG/5ML
ELIXIR ORAL
Qty: 200 | Refills: 0 | Status: ACTIVE

## 2019-05-28 ENCOUNTER — OFFICE (AMBULATORY)
Dept: URBAN - METROPOLITAN AREA CLINIC 75 | Facility: CLINIC | Age: 29
End: 2019-05-28

## 2019-05-28 VITALS
SYSTOLIC BLOOD PRESSURE: 120 MMHG | WEIGHT: 181 LBS | HEIGHT: 65 IN | DIASTOLIC BLOOD PRESSURE: 70 MMHG | HEART RATE: 80 BPM

## 2019-05-28 DIAGNOSIS — R53.83 OTHER FATIGUE: ICD-10-CM

## 2019-05-28 DIAGNOSIS — K50.90 CROHN'S DISEASE, UNSPECIFIED, WITHOUT COMPLICATIONS: ICD-10-CM

## 2019-05-28 DIAGNOSIS — Z92.25 PERSONAL HISTORY OF IMMUNOSUPPRESSION THERAPY: ICD-10-CM

## 2019-05-28 DIAGNOSIS — D51.9 VITAMIN B12 DEFICIENCY ANEMIA, UNSPECIFIED: ICD-10-CM

## 2019-05-28 PROCEDURE — 99214 OFFICE O/P EST MOD 30 MIN: CPT | Performed by: INTERNAL MEDICINE

## 2019-07-23 ENCOUNTER — OFFICE VISIT (OUTPATIENT)
Dept: FAMILY MEDICINE CLINIC | Facility: CLINIC | Age: 29
End: 2019-07-23

## 2019-07-23 VITALS
DIASTOLIC BLOOD PRESSURE: 70 MMHG | HEIGHT: 65 IN | BODY MASS INDEX: 30.99 KG/M2 | RESPIRATION RATE: 16 BRPM | HEART RATE: 70 BPM | SYSTOLIC BLOOD PRESSURE: 110 MMHG | WEIGHT: 186 LBS | OXYGEN SATURATION: 100 % | TEMPERATURE: 98 F

## 2019-07-23 DIAGNOSIS — F41.9 ANXIETY: Primary | ICD-10-CM

## 2019-07-23 DIAGNOSIS — K76.0 FATTY LIVER: ICD-10-CM

## 2019-07-23 DIAGNOSIS — K50.018 CROHN'S DISEASE OF SMALL INTESTINE WITH OTHER COMPLICATION (HCC): ICD-10-CM

## 2019-07-23 DIAGNOSIS — Z90.49 HISTORY OF BOWEL RESECTION: ICD-10-CM

## 2019-07-23 DIAGNOSIS — Z90.49 H/O TOTAL COLECTOMY: ICD-10-CM

## 2019-07-23 DIAGNOSIS — E61.1 IRON DEFICIENCY: ICD-10-CM

## 2019-07-23 DIAGNOSIS — F33.1 MODERATE EPISODE OF RECURRENT MAJOR DEPRESSIVE DISORDER (HCC): ICD-10-CM

## 2019-07-23 DIAGNOSIS — G43.109 MIGRAINE WITH AURA AND WITHOUT STATUS MIGRAINOSUS, NOT INTRACTABLE: ICD-10-CM

## 2019-07-23 DIAGNOSIS — R79.89 LOW SERUM CORTISOL LEVEL: ICD-10-CM

## 2019-07-23 PROBLEM — K50.90 CROHN'S DISEASE (HCC): Status: ACTIVE | Noted: 2019-07-23

## 2019-07-23 PROCEDURE — 99204 OFFICE O/P NEW MOD 45 MIN: CPT | Performed by: PHYSICIAN ASSISTANT

## 2019-07-23 RX ORDER — SUMATRIPTAN 50 MG/1
50 TABLET, FILM COATED ORAL
COMMUNITY
Start: 2018-12-28 | End: 2019-07-23 | Stop reason: SDUPTHER

## 2019-07-23 RX ORDER — SUMATRIPTAN 50 MG/1
50 TABLET, FILM COATED ORAL ONCE AS NEEDED
Qty: 9 TABLET | Refills: 5 | Status: SHIPPED | OUTPATIENT
Start: 2019-07-23 | End: 2019-12-27

## 2019-07-23 RX ORDER — ESCITALOPRAM OXALATE 20 MG/1
TABLET ORAL
Qty: 45 TABLET | Refills: 5 | Status: SHIPPED | OUTPATIENT
Start: 2019-07-23 | End: 2019-12-27

## 2019-07-23 RX ORDER — PANTOPRAZOLE SODIUM 40 MG/1
40 TABLET, DELAYED RELEASE ORAL DAILY
COMMUNITY

## 2019-07-23 NOTE — PROGRESS NOTES
Subjective   Cortney White is a 29 y.o. female.     History of Present Illness   Cortney White 29 y.o. female who presents today for a new patient appointment.    she has a history of   Patient Active Problem List   Diagnosis   • Anxiety   • Moderate episode of recurrent major depressive disorder (CMS/HCC)   • Low serum cortisol level (CMS/HCC)   • Iron deficiency   • H/O total colectomy   • Migraine with aura and without status migrainosus, not intractable   • Crohn's disease (CMS/HCC)   .  she is here to establish care I reviewed the PFSH recorded today by my MA/LPN staff.   she   She has been feeling anxious.    Sees Dr Doty for GI  Total colectomy and some small bowel resections;  Has ostomy and current hernia at site     Also has fatty liver  Cortisol level was low and will need referral to endocrine---Dr. Doty and will refer to DR Senior.    I can see labs in records; labs one year thyroid labs;  Last B12 1,367  Had CBC 4-2-19  She is on B12, iron  Just saw GYN    Cortney White female 29 y.o. who presents today for follow up of Depression and Anxiety.  She reports med helps and only Rx tried; need copy of prior [facility] testing.  She is here with mom.  She is having break through anxiety and depression.  I want her to cont. Psychotherapy and do see psychiatrist about this.  She agrees. Onset of symptoms was approximately several years ago.  She denies current suicidal and homicidal ideation. Risk factors are family history of anxiety and or depression and lifestyle of multiple roles, chronic pain and illness.  Previous treatment includes current Rx.  She complains of the following medication side effects: none.  The patient is currently in counseling..  Dr Hanson is colorectal surgeon    Has hx migraine and can be with aura; Imitrex works well and takes a few times a month.  NO change in migraines        The following portions of the patient's history were reviewed and updated as  appropriate: allergies, current medications, past family history, past medical history, past social history, past surgical history and problem list.    Review of Systems   Constitutional: Positive for fatigue. Negative for activity change, appetite change and unexpected weight change.   HENT: Negative for nosebleeds and trouble swallowing.    Eyes: Negative for pain and visual disturbance.   Respiratory: Negative for chest tightness, shortness of breath and wheezing.    Cardiovascular: Negative for chest pain and palpitations.   Gastrointestinal: Positive for nausea. Negative for abdominal pain and blood in stool.   Endocrine: Positive for heat intolerance, polyphagia and polyuria.   Genitourinary: Negative for difficulty urinating and hematuria.   Musculoskeletal: Negative for joint swelling.   Skin: Negative for color change and rash.   Allergic/Immunologic: Negative.    Neurological: Positive for weakness. Negative for syncope and speech difficulty.   Hematological: Negative for adenopathy.   Psychiatric/Behavioral: Positive for dysphoric mood. Negative for agitation and confusion.   All other systems reviewed and are negative.      Objective   Physical Exam   Constitutional: She is oriented to person, place, and time. She appears well-developed and well-nourished. No distress.   HENT:   Head: Normocephalic and atraumatic.   Right Ear: External ear normal.   Left Ear: External ear normal.   Nose: Nose normal.   Mouth/Throat: Oropharynx is clear and moist. No oropharyngeal exudate.   Eyes: Conjunctivae and EOM are normal. Pupils are equal, round, and reactive to light. Right eye exhibits no discharge. Left eye exhibits no discharge. No scleral icterus.   Slight strabismus right eye   Neck: Normal range of motion. Neck supple. No tracheal deviation present. No thyromegaly present.   Cardiovascular: Normal rate, regular rhythm, normal heart sounds, intact distal pulses and normal pulses. Exam reveals no gallop.   No  murmur heard.  Pulmonary/Chest: Effort normal and breath sounds normal. No respiratory distress. She has no wheezes. She has no rales.   Abdominal: Bowel sounds are normal.   Musculoskeletal: Normal range of motion.   Lymphadenopathy:     She has no cervical adenopathy.   Neurological: She is alert and oriented to person, place, and time. She exhibits normal muscle tone. Coordination normal.   Skin: Skin is warm. No rash noted. No erythema. No pallor.   Psychiatric: She has a normal mood and affect. Her behavior is normal. Judgment and thought content normal.   Nursing note and vitals reviewed.      Assessment/Plan   Problems Addressed this Visit        Cardiovascular and Mediastinum    Migraine with aura and without status migrainosus, not intractable    Relevant Medications    escitalopram (LEXAPRO) 20 MG tablet    SUMAtriptan (IMITREX) 50 MG tablet    Other Relevant Orders    Comprehensive metabolic panel    Lipid panel    CBC and Differential    TSH    T3, Free    T4, Free    Vitamin B12    Folate    Vitamin D 25 Hydroxy    Ferritin    Iron Profile       Digestive    Iron deficiency    Relevant Orders    Comprehensive metabolic panel    Lipid panel    CBC and Differential    TSH    T3, Free    T4, Free    Vitamin B12    Folate    Vitamin D 25 Hydroxy    Ferritin    Iron Profile    Crohn's disease (CMS/HCC)    Relevant Orders    Comprehensive metabolic panel    Lipid panel    CBC and Differential    TSH    T3, Free    T4, Free    Vitamin B12    Folate    Vitamin D 25 Hydroxy    Ferritin    Iron Profile       Other    Anxiety - Primary    Relevant Orders    Comprehensive metabolic panel    Lipid panel    CBC and Differential    TSH    T3, Free    T4, Free    Vitamin B12    Folate    Vitamin D 25 Hydroxy    Ferritin    Iron Profile    Moderate episode of recurrent major depressive disorder (CMS/HCC)    Relevant Medications    escitalopram (LEXAPRO) 20 MG tablet    Other Relevant Orders    Comprehensive metabolic  panel    Lipid panel    CBC and Differential    TSH    T3, Free    T4, Free    Vitamin B12    Folate    Vitamin D 25 Hydroxy    Ferritin    Iron Profile    Low serum cortisol level (CMS/HCC)    Relevant Orders    Comprehensive metabolic panel    Lipid panel    CBC and Differential    TSH    T3, Free    T4, Free    Vitamin B12    Folate    Vitamin D 25 Hydroxy    Ferritin    Iron Profile    Ambulatory Referral to Endocrinology    H/O total colectomy    Relevant Orders    Comprehensive metabolic panel    Lipid panel    CBC and Differential    TSH    T3, Free    T4, Free    Vitamin B12    Folate    Vitamin D 25 Hydroxy    Ferritin    Iron Profile      Other Visit Diagnoses     Fatty liver        Relevant Orders    Comprehensive metabolic panel    Lipid panel    CBC and Differential    TSH    T3, Free    T4, Free    Vitamin B12    Folate    Vitamin D 25 Hydroxy    Ferritin    Iron Profile          I will update labs--just had Cortisol  Refer to psych--refill Lexapro for now  Refer endocrine for cortisol low  F/u all specialists  Refill Imitrex and works well

## 2019-07-30 LAB
25(OH)D3+25(OH)D2 SERPL-MCNC: 25.3 NG/ML (ref 30–100)
ALBUMIN SERPL-MCNC: 4.5 G/DL (ref 3.5–5.2)
ALBUMIN/GLOB SERPL: 1.5 G/DL
ALP SERPL-CCNC: 99 U/L (ref 39–117)
ALT SERPL-CCNC: 76 U/L (ref 1–33)
AST SERPL-CCNC: 45 U/L (ref 1–32)
BASOPHILS # BLD AUTO: 0.06 10*3/MM3 (ref 0–0.2)
BASOPHILS NFR BLD AUTO: 0.6 % (ref 0–1.5)
BILIRUB SERPL-MCNC: 0.4 MG/DL (ref 0.2–1.2)
BUN SERPL-MCNC: 7 MG/DL (ref 6–20)
BUN/CREAT SERPL: 9.9 (ref 7–25)
CALCIUM SERPL-MCNC: 9.8 MG/DL (ref 8.6–10.5)
CHLORIDE SERPL-SCNC: 101 MMOL/L (ref 98–107)
CHOLEST SERPL-MCNC: 188 MG/DL (ref 0–200)
CO2 SERPL-SCNC: 24.4 MMOL/L (ref 22–29)
CREAT SERPL-MCNC: 0.71 MG/DL (ref 0.57–1)
EOSINOPHIL # BLD AUTO: 0.08 10*3/MM3 (ref 0–0.4)
EOSINOPHIL NFR BLD AUTO: 0.9 % (ref 0.3–6.2)
ERYTHROCYTE [DISTWIDTH] IN BLOOD BY AUTOMATED COUNT: 13.1 % (ref 12.3–15.4)
FERRITIN SERPL-MCNC: 165 NG/ML (ref 13–150)
FOLATE SERPL-MCNC: >20 NG/ML (ref 4.78–24.2)
GLOBULIN SER CALC-MCNC: 3 GM/DL
GLUCOSE SERPL-MCNC: 133 MG/DL (ref 65–99)
HCT VFR BLD AUTO: 51.6 % (ref 34–46.6)
HDLC SERPL-MCNC: 41 MG/DL (ref 40–60)
HGB BLD-MCNC: 15.6 G/DL (ref 12–15.9)
IMM GRANULOCYTES # BLD AUTO: 0.02 10*3/MM3 (ref 0–0.05)
IMM GRANULOCYTES NFR BLD AUTO: 0.2 % (ref 0–0.5)
IRON SATN MFR SERPL: 11 % (ref 20–50)
IRON SERPL-MCNC: 52 MCG/DL (ref 37–145)
LDLC SERPL CALC-MCNC: 70 MG/DL (ref 0–100)
LYMPHOCYTES # BLD AUTO: 2.81 10*3/MM3 (ref 0.7–3.1)
LYMPHOCYTES NFR BLD AUTO: 30.4 % (ref 19.6–45.3)
MCH RBC QN AUTO: 28.4 PG (ref 26.6–33)
MCHC RBC AUTO-ENTMCNC: 30.2 G/DL (ref 31.5–35.7)
MCV RBC AUTO: 94 FL (ref 79–97)
MONOCYTES # BLD AUTO: 0.62 10*3/MM3 (ref 0.1–0.9)
MONOCYTES NFR BLD AUTO: 6.7 % (ref 5–12)
NEUTROPHILS # BLD AUTO: 5.65 10*3/MM3 (ref 1.7–7)
NEUTROPHILS NFR BLD AUTO: 61.2 % (ref 42.7–76)
NRBC BLD AUTO-RTO: 0 /100 WBC (ref 0–0.2)
PLATELET # BLD AUTO: 343 10*3/MM3 (ref 140–450)
POTASSIUM SERPL-SCNC: 4.2 MMOL/L (ref 3.5–5.2)
PROT SERPL-MCNC: 7.5 G/DL (ref 6–8.5)
RBC # BLD AUTO: 5.49 10*6/MM3 (ref 3.77–5.28)
SODIUM SERPL-SCNC: 141 MMOL/L (ref 136–145)
T3FREE SERPL-MCNC: 3.9 PG/ML (ref 2–4.4)
T4 FREE SERPL-MCNC: 1.58 NG/DL (ref 0.93–1.7)
TIBC SERPL-MCNC: 468 MCG/DL
TRIGL SERPL-MCNC: 386 MG/DL (ref 0–150)
TSH SERPL DL<=0.005 MIU/L-ACNC: 6.5 MIU/ML (ref 0.27–4.2)
UIBC SERPL-MCNC: 416 MCG/DL (ref 112–346)
VIT B12 SERPL-MCNC: 969 PG/ML (ref 211–946)
VLDLC SERPL CALC-MCNC: 77.2 MG/DL
WBC # BLD AUTO: 9.24 10*3/MM3 (ref 3.4–10.8)

## 2019-07-31 LAB
HBA1C MFR BLD: 5.49 % (ref 4.8–5.6)
Lab: NORMAL
WRITTEN AUTHORIZATION: NORMAL

## 2019-08-02 RX ORDER — ICOSAPENT ETHYL 1000 MG/1
2 CAPSULE ORAL 2 TIMES DAILY WITH MEALS
Qty: 120 CAPSULE | Refills: 11 | Status: SHIPPED | OUTPATIENT
Start: 2019-08-02 | End: 2019-08-08 | Stop reason: SDUPTHER

## 2019-08-08 ENCOUNTER — OFFICE VISIT (OUTPATIENT)
Dept: ENDOCRINOLOGY | Age: 29
End: 2019-08-08

## 2019-08-08 VITALS
DIASTOLIC BLOOD PRESSURE: 82 MMHG | SYSTOLIC BLOOD PRESSURE: 126 MMHG | RESPIRATION RATE: 16 BRPM | BODY MASS INDEX: 30.42 KG/M2 | WEIGHT: 182.6 LBS | HEIGHT: 65 IN

## 2019-08-08 DIAGNOSIS — R79.89 ABNORMAL LFTS: ICD-10-CM

## 2019-08-08 DIAGNOSIS — R79.89 LOW SERUM CORTISOL LEVEL: Primary | ICD-10-CM

## 2019-08-08 DIAGNOSIS — E03.9 PRIMARY HYPOTHYROIDISM: ICD-10-CM

## 2019-08-08 DIAGNOSIS — R68.89 HEAT INTOLERANCE: ICD-10-CM

## 2019-08-08 DIAGNOSIS — R73.9 HYPERGLYCEMIA: ICD-10-CM

## 2019-08-08 DIAGNOSIS — K76.0 NONALCOHOLIC FATTY LIVER DISEASE: ICD-10-CM

## 2019-08-08 DIAGNOSIS — E78.5 DYSLIPIDEMIA: ICD-10-CM

## 2019-08-08 DIAGNOSIS — R53.82 CHRONIC FATIGUE: ICD-10-CM

## 2019-08-08 DIAGNOSIS — E55.9 VITAMIN D DEFICIENCY: ICD-10-CM

## 2019-08-08 PROCEDURE — 99204 OFFICE O/P NEW MOD 45 MIN: CPT | Performed by: INTERNAL MEDICINE

## 2019-08-08 RX ORDER — ICOSAPENT ETHYL 1000 MG/1
2 CAPSULE ORAL 2 TIMES DAILY WITH MEALS
Qty: 120 CAPSULE | Refills: 11 | Status: SHIPPED | OUTPATIENT
Start: 2019-08-08 | End: 2020-10-02 | Stop reason: SDUPTHER

## 2019-08-08 RX ORDER — ERGOCALCIFEROL 1.25 MG/1
50000 CAPSULE ORAL 2 TIMES WEEKLY
Qty: 26 CAPSULE | Refills: 3 | Status: SHIPPED | OUTPATIENT
Start: 2019-08-08 | End: 2020-06-30

## 2019-08-08 RX ORDER — MELATONIN
2000 DAILY
COMMUNITY

## 2019-08-08 RX ORDER — LEVOTHYROXINE SODIUM 75 UG/1
75 TABLET ORAL DAILY
Qty: 30 TABLET | Refills: 5 | Status: SHIPPED | OUTPATIENT
Start: 2019-08-08 | End: 2020-02-26

## 2019-08-08 NOTE — PROGRESS NOTES
"Subjective   Cortney White is a 29 y.o. female seen as a new patient for abnormal thyroid and low cortisol. She is having fatigue, heat intolerance, sweating, frequent urination.     History of Present Illness is a 29-year-old female who has been referred for further evaluation and treatment of abnormal thyroid function as well as low cortisol.  She is accompanied by both parents.  She is a well-known patient for Crohn's disease and prior to her colectomy she was on different therapies which necessitated the use of high-dose steroid sometimes up to 80 mg of prednisone per day.  She also complained of being very tired and sweating as well as having a heat intolerance.  She has never  nor has she ever been pregnant.  For the past 10 years she has been on injectable progesterone for birth control purposes.  On further question she admitted to the fact that she has a very strong craving for salty food.  Her family history is positive for hypothyroidism in her maternal side of the family and type 2 diabetes in the paternal side of the family.    /82   Resp 16   Ht 165.1 cm (65\")   Wt 82.8 kg (182 lb 9.6 oz)   LMP  (LMP Unknown)   BMI 30.39 kg/m²      Allergies   Allergen Reactions   • Dicyclomine Other (See Comments) and Shortness Of Breath     DIZZINESS   • Pepcid [Famotidine] Anaphylaxis   • Reglan [Metoclopramide] Anaphylaxis   • Bentyl [Dicyclomine Hcl]    • Ciprofloxacin    • Diphenhydramine Other (See Comments)     Agitation    • Flagyl [Metronidazole]    • Morphine Itching   • Morphine And Related    • Mycophenolate    • Nsaids Other (See Comments)     C/i with CROHNS   • Phenergan [Promethazine Hcl]    • Promethazine Confusion     Pt reports feeling confused and having trouble swallowing when taking medicine on last visit approx 3 weeks ago. Says she has not previously had an allergic reaction and pt says she has taken phenergan many times before with no problem   • Sulfa Antibiotics  "       Current Outpatient Medications:   •  acetaminophen-codeine (TYLENOL #3) 300-30 MG per tablet, , Disp: , Rfl:   •  cetirizine (zyrTEC) 10 MG tablet, Take 10 mg by mouth., Disp: , Rfl:   •  cholecalciferol (VITAMIN D3) 1000 units tablet, Take 2,000 Units by mouth Daily., Disp: , Rfl:   •  cyanocobalamin 1000 MCG/ML injection, Inject 1,000 mcg into the appropriate muscle as directed by prescriber., Disp: , Rfl:   •  escitalopram (LEXAPRO) 20 MG tablet, 1.5 tabs PO daily for anxiety and depression, Disp: 45 tablet, Rfl: 5  •  ferrous sulfate 325 (65 FE) MG tablet, Take 325 mg by mouth Daily With Breakfast., Disp: , Rfl:   •  HYDROcodone-acetaminophen (NORCO) 7.5-325 MG per tablet, TK 1 T PO TID PRN, Disp: , Rfl: 0  •  ibuprofen (ADVIL,MOTRIN) 800 MG tablet, , Disp: , Rfl:   •  icosapent ethyl (VASCEPA) 1 g capsule capsule, Take 2 g by mouth 2 (Two) Times a Day With Meals. For trigs, Disp: 120 capsule, Rfl: 11  •  loperamide (IMODIUM) 2 MG capsule, TK 2 TO 3 CS PO QID 1/2 HOUR BEFORE MEALS AND 1/2 HOUR BEFORE BEDTIME, Disp: , Rfl:   •  Loperamide HCl (IMODIUM PO), Take  by mouth., Disp: , Rfl:   •  medroxyPROGESTERone (DEPO-PROVERA) 150 MG/ML injection, Inject 150 mg into the appropriate muscle as directed by prescriber., Disp: , Rfl:   •  ondansetron ODT (ZOFRAN-ODT) 4 MG disintegrating tablet, Take 4 mg by mouth Every 8 (Eight) Hours As Needed for Nausea or Vomiting., Disp: , Rfl:   •  pantoprazole (PROTONIX) 40 MG EC tablet, Take 40 mg by mouth Daily., Disp: , Rfl:   •  Pediatric Multivit-Minerals-C (CENTRUM KIDS COMPLETE PO), Take  by mouth., Disp: , Rfl:   •  SUMAtriptan (IMITREX) 50 MG tablet, Take 1 tablet by mouth 1 (One) Time As Needed for Migraine for up to 1 dose. May repeat dose X 1 after 2 hours if cont. h/a, Disp: 9 tablet, Rfl: 5  •  Ustekinumab (STELARA) 90 MG/ML solution prefilled syringe Injection, , Disp: , Rfl:   •  Vedolizumab (ENTYVIO IV), Infuse  into a venous catheter., Disp: , Rfl:   •   vedolizumab (ENTYVIO) 300 MG injection, , Disp: , Rfl:       The following portions of the patient's history were reviewed and updated as appropriate: allergies, current medications, past family history, past medical history, past social history, past surgical history and problem list.    Review of Systems   Constitutional: Positive for fatigue.   HENT: Negative.    Eyes: Negative.    Respiratory: Negative.    Cardiovascular: Negative.    Gastrointestinal: Negative.    Endocrine: Positive for heat intolerance.   Genitourinary: Positive for frequency.   Musculoskeletal: Negative.    Skin: Negative.    Allergic/Immunologic: Negative.    Neurological: Negative.    Hematological: Negative.    Psychiatric/Behavioral: Negative.        Objective   Physical Exam   Constitutional: She is oriented to person, place, and time. She appears well-developed and well-nourished. No distress.   HENT:   Head: Normocephalic and atraumatic.   Right Ear: External ear normal.   Left Ear: External ear normal.   Nose: Nose normal.   Mouth/Throat: Oropharynx is clear and moist. No oropharyngeal exudate.   Eyes: Conjunctivae and EOM are normal. Pupils are equal, round, and reactive to light. Right eye exhibits no discharge. Left eye exhibits no discharge. No scleral icterus.   Neck: Normal range of motion. Neck supple. No JVD present. No tracheal deviation present. No thyromegaly present.   Cardiovascular: Normal rate, regular rhythm, normal heart sounds and intact distal pulses. Exam reveals no gallop and no friction rub.   No murmur heard.  Pulmonary/Chest: Effort normal and breath sounds normal. No stridor. No respiratory distress. She has no wheezes. She has no rales. She exhibits no tenderness.   Abdominal: Soft. Bowel sounds are normal. She exhibits no distension and no mass. There is no tenderness. There is no rebound and no guarding. No hernia.   Musculoskeletal: Normal range of motion. She exhibits no edema, tenderness or deformity.    Lymphadenopathy:     She has no cervical adenopathy.   Neurological: She is alert and oriented to person, place, and time. She has normal reflexes. She displays normal reflexes. No cranial nerve deficit or sensory deficit. She exhibits normal muscle tone. Coordination normal.   Skin: Skin is warm and dry. No rash noted. She is not diaphoretic. No erythema. No pallor.   Psychiatric: She has a normal mood and affect. Her behavior is normal. Judgment and thought content normal.   Nursing note and vitals reviewed.        Assessment/Plan   Diagnoses and all orders for this visit:    Low serum cortisol level (CMS/[facility])  -     Prolactin  -     Follicle Stimulating Hormone  -     C-Peptide  -     ACTH  -     Cortisol  -     Calcium, Ionized  -     PTH, Intact  -     Phosphorus  -     Celiac Comprehensive Panel  -     Luteinizing Hormone  -     T3, Free; Future  -     T4 & TSH ([facility]); Future  -     T4, Free; Future  -     Uric Acid; Future  -     Vitamin D 25 Hydroxy; Future  -     Comprehensive Metabolic Panel; Future  -     C-Peptide; Future  -     Follicle Stimulating Hormone; Future  -     Hemoglobin A1c; Future  -     Lipid Panel; Future  -     Luteinizing Hormone; Future  -     Prolactin; Future  -     ACTH; Future  -     Cortisol; Future  -     Thyroglobulin With Anti-TG; Future    Primary hypothyroidism  -     Prolactin  -     Follicle Stimulating Hormone  -     C-Peptide  -     ACTH  -     Cortisol  -     Calcium, Ionized  -     PTH, Intact  -     Phosphorus  -     Celiac Comprehensive Panel  -     Luteinizing Hormone  -     T3, Free; Future  -     T4 & TSH ([facility]); Future  -     T4, Free; Future  -     Uric Acid; Future  -     Vitamin D 25 Hydroxy; Future  -     Comprehensive Metabolic Panel; Future  -     C-Peptide; Future  -     Follicle Stimulating Hormone; Future  -     Hemoglobin A1c; Future  -     Lipid Panel; Future  -     Luteinizing Hormone; Future  -     Prolactin; Future  -     ACTH; Future  -      Cortisol; Future  -     Thyroglobulin With Anti-TG; Future    Dyslipidemia  -     Prolactin  -     Follicle Stimulating Hormone  -     C-Peptide  -     ACTH  -     Cortisol  -     Calcium, Ionized  -     PTH, Intact  -     Phosphorus  -     Celiac Comprehensive Panel  -     Luteinizing Hormone  -     T3, Free; Future  -     T4 & TSH ([facility]); Future  -     T4, Free; Future  -     Uric Acid; Future  -     Vitamin D 25 Hydroxy; Future  -     Comprehensive Metabolic Panel; Future  -     C-Peptide; Future  -     Follicle Stimulating Hormone; Future  -     Hemoglobin A1c; Future  -     Lipid Panel; Future  -     Luteinizing Hormone; Future  -     Prolactin; Future  -     ACTH; Future  -     Cortisol; Future  -     Thyroglobulin With Anti-TG; Future    Hyperglycemia  -     Prolactin  -     Follicle Stimulating Hormone  -     C-Peptide  -     ACTH  -     Cortisol  -     Calcium, Ionized  -     PTH, Intact  -     Phosphorus  -     Celiac Comprehensive Panel  -     Luteinizing Hormone  -     T3, Free; Future  -     T4 & TSH ([facility]); Future  -     T4, Free; Future  -     Uric Acid; Future  -     Vitamin D 25 Hydroxy; Future  -     Comprehensive Metabolic Panel; Future  -     C-Peptide; Future  -     Follicle Stimulating Hormone; Future  -     Hemoglobin A1c; Future  -     Lipid Panel; Future  -     Luteinizing Hormone; Future  -     Prolactin; Future  -     ACTH; Future  -     Cortisol; Future  -     Thyroglobulin With Anti-TG; Future    Vitamin D deficiency  -     Prolactin  -     Follicle Stimulating Hormone  -     C-Peptide  -     ACTH  -     Cortisol  -     Calcium, Ionized  -     PTH, Intact  -     Phosphorus  -     Celiac Comprehensive Panel  -     Luteinizing Hormone  -     T3, Free; Future  -     T4 & TSH ([facility]); Future  -     T4, Free; Future  -     Uric Acid; Future  -     Vitamin D 25 Hydroxy; Future  -     Comprehensive Metabolic Panel; Future  -     C-Peptide; Future  -     Follicle Stimulating Hormone;  Future  -     Hemoglobin A1c; Future  -     Lipid Panel; Future  -     Luteinizing Hormone; Future  -     Prolactin; Future  -     ACTH; Future  -     Cortisol; Future  -     Thyroglobulin With Anti-TG; Future    Chronic fatigue  -     Prolactin  -     Follicle Stimulating Hormone  -     C-Peptide  -     ACTH  -     Cortisol  -     Calcium, Ionized  -     PTH, Intact  -     Phosphorus  -     Celiac Comprehensive Panel  -     Luteinizing Hormone  -     T3, Free; Future  -     T4 & TSH ([facility]); Future  -     T4, Free; Future  -     Uric Acid; Future  -     Vitamin D 25 Hydroxy; Future  -     Comprehensive Metabolic Panel; Future  -     C-Peptide; Future  -     Follicle Stimulating Hormone; Future  -     Hemoglobin A1c; Future  -     Lipid Panel; Future  -     Luteinizing Hormone; Future  -     Prolactin; Future  -     ACTH; Future  -     Cortisol; Future  -     Thyroglobulin With Anti-TG; Future    Heat intolerance  -     Prolactin  -     Follicle Stimulating Hormone  -     C-Peptide  -     ACTH  -     Cortisol  -     Calcium, Ionized  -     PTH, Intact  -     Phosphorus  -     Celiac Comprehensive Panel  -     Luteinizing Hormone  -     T3, Free; Future  -     T4 & TSH ([facility]); Future  -     T4, Free; Future  -     Uric Acid; Future  -     Vitamin D 25 Hydroxy; Future  -     Comprehensive Metabolic Panel; Future  -     C-Peptide; Future  -     Follicle Stimulating Hormone; Future  -     Hemoglobin A1c; Future  -     Lipid Panel; Future  -     Luteinizing Hormone; Future  -     Prolactin; Future  -     ACTH; Future  -     Cortisol; Future  -     Thyroglobulin With Anti-TG; Future    Abnormal LFTs  -     US Abdomen Complete; Future    Nonalcoholic fatty liver disease  -     US Abdomen Complete; Future    Other orders  -     UNITHROID 75 MCG tablet; Take 1 tablet by mouth Daily.  -     ergocalciferol (ERGOCALCIFEROL) 06176 units capsule; Take 1 capsule by mouth 2 (Two) Times a Week.  -     VASCEPA 1 g capsule  capsule; Take 2 g by mouth 2 (Two) Times a Day With Meals. For trigs      In summary I saw and examined this 29-year-old female for above-mentioned problems.  I reviewed her laboratory evaluation of 7/29/2019 as well as previous laboratory evaluations when at this time I am going to add additional laboratory evaluation and once the results come back we will go ahead and call for any possible modification or new medications.  Although she has significant degree of high triglycerides and she was given Vascepa 2 capsules twice a day she was waiting to be seen today and then if it is okay with me to get started and I strongly encouraged her to go ahead and get started on Vascepa 2 capsules twice daily.  Also because of elevated levels of AST and ALT and high triglyceride I am scheduling her for an ultrasound of her liver to make sure she does not have fatty liver.  For the time being I am starting her on Unithroid 75 mcg daily and vitamin D 50,000 units twice per week and if she was unable to obtain that through her health insurance then I instructed her to purchase vitamin D3 5000 units and take 4 of those every day for a total of 140 units/week.  She will see Ms. Jami Vizcarra in 4 months or sooner in follow-up.  With laboratory evaluation prior to the office visit.

## 2019-08-09 ENCOUNTER — TELEPHONE (OUTPATIENT)
Dept: ENDOCRINOLOGY | Age: 29
End: 2019-08-09

## 2019-08-09 LAB
ACTH PLAS-MCNC: 7.4 PG/ML (ref 7.2–63.3)
C PEPTIDE SERPL-MCNC: 11.4 NG/ML (ref 1.1–4.4)
CA-I SERPL ISE-MCNC: 5.3 MG/DL (ref 4.5–5.6)
CORTIS SERPL-MCNC: 2.6 UG/DL
ENDOMYSIUM IGA SER QL: NEGATIVE
FSH SERPL-ACNC: 4.2 MIU/ML
GLIADIN PEPTIDE IGA SER-ACNC: 5 UNITS (ref 0–19)
GLIADIN PEPTIDE IGG SER-ACNC: 3 UNITS (ref 0–19)
IGA SERPL-MCNC: 332 MG/DL (ref 87–352)
LH SERPL-ACNC: 3.1 MIU/ML
PHOSPHATE SERPL-MCNC: 3.4 MG/DL (ref 2.5–4.5)
PROLACTIN SERPL-MCNC: 10.6 NG/ML (ref 4.8–23.3)
PTH-INTACT SERPL-MCNC: 30 PG/ML (ref 15–65)
TTG IGA SER-ACNC: <2 U/ML (ref 0–3)
TTG IGG SER-ACNC: <2 U/ML (ref 0–5)

## 2019-08-09 NOTE — TELEPHONE ENCOUNTER
----- Message from Suad S Ramirez sent at 8/9/2019 11:03 AM EDT -----  Contact: Brice - Father   Father  Brice said he is confused how patient is to take Vitamin D3. Patient told Dr. Senior she was taking 1000mg daily.    Father said he thought Dr. Senior said for patient to take 5000 mg tabs and to take 2 tabs 2xday for a total of 20,000 daily and to take a total of 140,000 weekly.    He said discharge paperwork said to take 74148 capsule, 1 capsule 2xweek.     Father said A1c was 5.4 and Dr. Senior said patient is trending toward diabetes. He asked what is the definitve test for diabetes and asked if patient should be testing her blood sugar daily? If the answer is yes, then patient is not testing and asked for meter, test strips and lancets.   Ashley - [phone]      Brice - [phone]      Tried to call patient's father. No answer and voicemail full.    Called patient and spoke to patient in detail about all 3 medications that were given by Dr. Senior during office visit along with dose and how to take each correctly. Patient expressed understanding.

## 2019-08-12 ENCOUNTER — TELEPHONE (OUTPATIENT)
Dept: FAMILY MEDICINE CLINIC | Facility: CLINIC | Age: 29
End: 2019-08-12

## 2019-08-12 NOTE — TELEPHONE ENCOUNTER
Pt's Mom called asking for copy of pt's lab results.  Mailed to pt @ [id number] [facility], [address] [place] [address].

## 2019-08-15 ENCOUNTER — TELEPHONE (OUTPATIENT)
Dept: FAMILY MEDICINE CLINIC | Facility: CLINIC | Age: 29
End: 2019-08-15

## 2019-08-15 NOTE — TELEPHONE ENCOUNTER
Pt called stating that she has been discharged from Dr. Cortés's office for missing a drug screen.  Pt is asking if you can prescribe pain med until she can find new pain mgt.

## 2019-09-03 ENCOUNTER — HOSPITAL ENCOUNTER (OUTPATIENT)
Dept: ULTRASOUND IMAGING | Facility: HOSPITAL | Age: 29
Discharge: HOME | End: 2019-09-03
Admitting: INTERNAL MEDICINE

## 2019-09-03 DIAGNOSIS — K76.0 NONALCOHOLIC FATTY LIVER DISEASE: ICD-10-CM

## 2019-09-03 DIAGNOSIS — R79.89 ABNORMAL LFTS: ICD-10-CM

## 2019-09-03 PROCEDURE — 76700 US EXAM ABDOM COMPLETE: CPT

## 2019-09-16 ENCOUNTER — TELEPHONE (OUTPATIENT)
Dept: ENDOCRINOLOGY | Age: 29
End: 2019-09-16

## 2019-09-16 NOTE — TELEPHONE ENCOUNTER
PATIENT MOTHER IS CALLING REGARDING THE LAB RESULTS FROM 09/10/2019    PLEASE LET PATIENT KNOW

## 2019-09-17 ENCOUNTER — TELEPHONE (OUTPATIENT)
Dept: ENDOCRINOLOGY | Age: 29
End: 2019-09-17

## 2019-09-17 NOTE — TELEPHONE ENCOUNTER
Spoke to patient's mother. Discussed labs and US. Mother expressed understanding. Mailed another copy of labs and US to patient as requested by mother.

## 2019-09-17 NOTE — TELEPHONE ENCOUNTER
Left mother a voicemail stating that we do not have labs from 09/10/2019. Labs from 08/2019 were previously discussed. Asked the patient's mother to call if she had further questions.

## 2019-09-17 NOTE — TELEPHONE ENCOUNTER
Mother Jolene ph. [phone] asked if you will mail the letters again that Dr. Senior sent patient on 8-1-19 with lab results and 9-6-19 letter on results of ultra sound.     She asked for call back to go over everything with her. She said patient is concerned about cortisol levels and asked if anything was found.  She said patient has not been feeling well and feeling extremely tired. She said patient can sleep all day.

## 2019-09-24 ENCOUNTER — OFFICE (AMBULATORY)
Dept: URBAN - METROPOLITAN AREA CLINIC 75 | Facility: CLINIC | Age: 29
End: 2019-09-24

## 2019-09-24 ENCOUNTER — TELEPHONE (OUTPATIENT)
Dept: ENDOCRINOLOGY | Age: 29
End: 2019-09-24

## 2019-09-24 VITALS
SYSTOLIC BLOOD PRESSURE: 128 MMHG | HEART RATE: 71 BPM | DIASTOLIC BLOOD PRESSURE: 82 MMHG | HEIGHT: 65 IN | WEIGHT: 185 LBS | OXYGEN SATURATION: 92 %

## 2019-09-24 DIAGNOSIS — Z92.25 PERSONAL HISTORY OF IMMUNOSUPPRESSION THERAPY: ICD-10-CM

## 2019-09-24 DIAGNOSIS — K50.90 CROHN'S DISEASE, UNSPECIFIED, WITHOUT COMPLICATIONS: ICD-10-CM

## 2019-09-24 DIAGNOSIS — R10.13 EPIGASTRIC PAIN: ICD-10-CM

## 2019-09-24 DIAGNOSIS — D51.9 VITAMIN B12 DEFICIENCY ANEMIA, UNSPECIFIED: ICD-10-CM

## 2019-09-24 DIAGNOSIS — R53.83 OTHER FATIGUE: ICD-10-CM

## 2019-09-24 DIAGNOSIS — R89.1: ICD-10-CM

## 2019-09-24 PROCEDURE — 99214 OFFICE O/P EST MOD 30 MIN: CPT | Performed by: INTERNAL MEDICINE

## 2019-09-24 RX ORDER — PANTOPRAZOLE SODIUM 40 MG/1
80 TABLET, DELAYED RELEASE ORAL
Qty: 180 | Refills: 1 | Status: ACTIVE

## 2019-09-24 NOTE — TELEPHONE ENCOUNTER
Laboratory evaluations of 8/8/2019 is essentially in a normal range as does not explain her symptoms of being tired and fatigued.  I would suggest for her to see her PCP for may be a prescription for antidepressant.

## 2019-09-27 ENCOUNTER — OFFICE VISIT (OUTPATIENT)
Dept: FAMILY MEDICINE CLINIC | Facility: CLINIC | Age: 29
End: 2019-09-27

## 2019-09-27 VITALS
RESPIRATION RATE: 16 BRPM | WEIGHT: 181 LBS | DIASTOLIC BLOOD PRESSURE: 83 MMHG | TEMPERATURE: 98.6 F | SYSTOLIC BLOOD PRESSURE: 115 MMHG | BODY MASS INDEX: 30.16 KG/M2 | OXYGEN SATURATION: 98 % | HEART RATE: 99 BPM | HEIGHT: 65 IN

## 2019-09-27 DIAGNOSIS — R79.89 LOW SERUM CORTISOL LEVEL: Primary | ICD-10-CM

## 2019-09-27 DIAGNOSIS — R53.83 FATIGUE, UNSPECIFIED TYPE: ICD-10-CM

## 2019-09-27 PROBLEM — G89.4 CHRONIC PAIN DISORDER: Status: ACTIVE | Noted: 2017-10-19

## 2019-09-27 PROBLEM — F33.2 SEVERE EPISODE OF RECURRENT MAJOR DEPRESSIVE DISORDER, WITHOUT PSYCHOTIC FEATURES (HCC): Status: ACTIVE | Noted: 2017-10-19

## 2019-09-27 PROCEDURE — 99213 OFFICE O/P EST LOW 20 MIN: CPT | Performed by: PHYSICIAN ASSISTANT

## 2019-09-27 RX ORDER — LORAZEPAM 0.5 MG/1
TABLET ORAL
Refills: 2 | COMMUNITY
Start: 2019-08-29

## 2019-09-27 RX ORDER — FLUCONAZOLE 150 MG/1
150 TABLET ORAL ONCE
Qty: 1 TABLET | Refills: 2 | Status: SHIPPED | OUTPATIENT
Start: 2019-09-27 | End: 2019-09-27

## 2019-09-27 NOTE — PROGRESS NOTES
Subjective   Cortney White is a 29 y.o. female.     History of Present Illness   Cortney White 29 y.o. female who presents today for fatigue  she has a history of   Patient Active Problem List   Diagnosis   • Anxiety   • Moderate episode of recurrent major depressive disorder (CMS/HCC)   • Low serum cortisol level (CMS/HCC)   • Iron deficiency   • H/O total colectomy   • Migraine with aura and without status migrainosus, not intractable   • Crohn's disease (CMS/HCC)   • Heat intolerance   • Chronic fatigue   • Vitamin D deficiency   • Hyperglycemia   • Dyslipidemia   • Primary hypothyroidism   • Abdominal pain   • B12 deficiency   • Chronic pain disorder   • Exacerbation of Crohn's disease of small intestine with complication (CMS/HCC)   • H/O iron deficiency   • Numbness and tingling in both hands   • Parastomal hernia   • Severe episode of recurrent major depressive disorder, without psychotic features (CMS/HCC)   .    I saw notes from  about low Cortisol; saw endocrine and told normal;  DR Doty is still concerned.  She is sleeping all the time  I will have Dr. Senior review notes from Dr Doty I just read------past low cortisol low in AM on prior lab  She is here with Dad; she is sleeping all the time and tired; denies snoring  She is still having depression and has appt about a month  Need f/u with endocrine  Wants to check if have mono---had age 19; I will get titers    Had stress echo 2012 and normal  NO SOA or chest pain  She is sweating and tired; sleeping 14 hours and still tired    The following portions of the patient's history were reviewed and updated as appropriate: allergies, current medications, past family history, past medical history, past social history, past surgical history and problem list.    Review of Systems   Constitutional: Negative for activity change, appetite change and unexpected weight change.   HENT: Negative for nosebleeds and trouble swallowing.    Eyes:  Negative for pain and visual disturbance.   Respiratory: Negative for chest tightness, shortness of breath and wheezing.    Cardiovascular: Negative for chest pain and palpitations.   Gastrointestinal: Negative for abdominal pain and blood in stool.   Endocrine: Negative.    Genitourinary: Negative for difficulty urinating and hematuria.   Musculoskeletal: Negative for joint swelling.   Skin: Negative for color change and rash.   Allergic/Immunologic: Negative.    Neurological: Negative for syncope and speech difficulty.   Hematological: Negative for adenopathy.   Psychiatric/Behavioral: Negative for agitation and confusion.   All other systems reviewed and are negative.      Objective   Physical Exam   Constitutional: She is oriented to person, place, and time. She appears well-developed and well-nourished. No distress.   HENT:   Head: Normocephalic and atraumatic.   Eyes: Conjunctivae and EOM are normal. Pupils are equal, round, and reactive to light. Right eye exhibits no discharge. Left eye exhibits no discharge. No scleral icterus.   Neck: Normal range of motion. Neck supple. No tracheal deviation present. No thyromegaly present.   Cardiovascular: Normal rate, regular rhythm, normal heart sounds, intact distal pulses and normal pulses. Exam reveals no gallop.   No murmur heard.  Pulmonary/Chest: Effort normal and breath sounds normal. No respiratory distress. She has no wheezes. She has no rales.   Musculoskeletal: Normal range of motion.   Neurological: She is alert and oriented to person, place, and time. She exhibits normal muscle tone. Coordination normal.   Skin: Skin is warm. No rash noted. No erythema. No pallor.   Psychiatric: She has a normal mood and affect. Her behavior is normal. Judgment and thought content normal.   Nursing note and vitals reviewed.      Assessment/Plan   Problems Addressed this Visit        Other    Low serum cortisol level (CMS/[facility]) - Primary      Other Visit Diagnoses      Fatigue, unspecified type          I will email Dr Senior; will do lab for mono; see psych  Also repeat CBC

## 2019-10-01 DIAGNOSIS — R79.89 LOW SERUM CORTISOL LEVEL: Primary | ICD-10-CM

## 2019-11-29 ENCOUNTER — RESULTS ENCOUNTER (OUTPATIENT)
Dept: ENDOCRINOLOGY | Age: 29
End: 2019-11-29

## 2019-11-29 DIAGNOSIS — R73.9 HYPERGLYCEMIA: ICD-10-CM

## 2019-11-29 DIAGNOSIS — E78.5 DYSLIPIDEMIA: ICD-10-CM

## 2019-11-29 DIAGNOSIS — E55.9 VITAMIN D DEFICIENCY: ICD-10-CM

## 2019-11-29 DIAGNOSIS — R53.82 CHRONIC FATIGUE: ICD-10-CM

## 2019-11-29 DIAGNOSIS — R79.89 LOW SERUM CORTISOL LEVEL: ICD-10-CM

## 2019-11-29 DIAGNOSIS — E03.9 PRIMARY HYPOTHYROIDISM: ICD-10-CM

## 2019-11-29 DIAGNOSIS — R68.89 HEAT INTOLERANCE: ICD-10-CM

## 2019-12-27 RX ORDER — SUMATRIPTAN 50 MG/1
TABLET, FILM COATED ORAL
Qty: 9 TABLET | Refills: 0 | Status: SHIPPED | OUTPATIENT
Start: 2019-12-27 | End: 2020-01-28

## 2019-12-27 RX ORDER — ESCITALOPRAM OXALATE 20 MG/1
TABLET ORAL
Qty: 135 TABLET | Refills: 0 | Status: SHIPPED | OUTPATIENT
Start: 2019-12-27 | End: 2020-02-13 | Stop reason: SDUPTHER

## 2020-01-01 ENCOUNTER — OFFICE (AMBULATORY)
Dept: URBAN - METROPOLITAN AREA LAB 2 | Facility: LAB | Age: 30
End: 2020-01-01
Payer: MEDICARE

## 2020-01-01 ENCOUNTER — OFFICE (AMBULATORY)
Dept: URBAN - METROPOLITAN AREA LAB 2 | Facility: LAB | Age: 30
End: 2020-01-01

## 2020-01-01 ENCOUNTER — OFFICE (AMBULATORY)
Dept: URBAN - METROPOLITAN AREA PATHOLOGY 4 | Facility: PATHOLOGY | Age: 30
End: 2020-01-01

## 2020-01-01 ENCOUNTER — OFFICE (AMBULATORY)
Dept: URBAN - METROPOLITAN AREA CLINIC 75 | Facility: CLINIC | Age: 30
End: 2020-01-01

## 2020-01-01 ENCOUNTER — AMBULATORY SURGICAL CENTER (AMBULATORY)
Dept: URBAN - METROPOLITAN AREA SURGERY 17 | Facility: SURGERY | Age: 30
End: 2020-01-01

## 2020-01-01 VITALS
DIASTOLIC BLOOD PRESSURE: 51 MMHG | DIASTOLIC BLOOD PRESSURE: 63 MMHG | HEART RATE: 84 BPM | RESPIRATION RATE: 17 BRPM | OXYGEN SATURATION: 97 % | HEART RATE: 70 BPM | HEART RATE: 87 BPM | DIASTOLIC BLOOD PRESSURE: 55 MMHG | DIASTOLIC BLOOD PRESSURE: 68 MMHG | SYSTOLIC BLOOD PRESSURE: 145 MMHG | DIASTOLIC BLOOD PRESSURE: 126 MMHG | OXYGEN SATURATION: 98 % | OXYGEN SATURATION: 100 % | HEART RATE: 80 BPM | TEMPERATURE: 96.6 F | OXYGEN SATURATION: 99 % | WEIGHT: 185 LBS | SYSTOLIC BLOOD PRESSURE: 114 MMHG | TEMPERATURE: 96.1 F | SYSTOLIC BLOOD PRESSURE: 115 MMHG | DIASTOLIC BLOOD PRESSURE: 90 MMHG | OXYGEN SATURATION: 95 % | RESPIRATION RATE: 11 BRPM | DIASTOLIC BLOOD PRESSURE: 66 MMHG | SYSTOLIC BLOOD PRESSURE: 84 MMHG | SYSTOLIC BLOOD PRESSURE: 110 MMHG | RESPIRATION RATE: 16 BRPM | HEART RATE: 82 BPM | RESPIRATION RATE: 14 BRPM | RESPIRATION RATE: 18 BRPM | RESPIRATION RATE: 20 BRPM | SYSTOLIC BLOOD PRESSURE: 98 MMHG | HEART RATE: 81 BPM | SYSTOLIC BLOOD PRESSURE: 95 MMHG | HEART RATE: 75 BPM | SYSTOLIC BLOOD PRESSURE: 101 MMHG | HEART RATE: 73 BPM | HEIGHT: 65 IN | DIASTOLIC BLOOD PRESSURE: 67 MMHG | DIASTOLIC BLOOD PRESSURE: 77 MMHG | SYSTOLIC BLOOD PRESSURE: 129 MMHG | DIASTOLIC BLOOD PRESSURE: 84 MMHG | SYSTOLIC BLOOD PRESSURE: 128 MMHG

## 2020-01-01 VITALS — HEIGHT: 65 IN

## 2020-01-01 VITALS — WEIGHT: 182 LBS | HEIGHT: 65 IN

## 2020-01-01 DIAGNOSIS — R94.5 ABNORMAL RESULTS OF LIVER FUNCTION STUDIES: ICD-10-CM

## 2020-01-01 DIAGNOSIS — K29.80 DUODENITIS WITHOUT BLEEDING: ICD-10-CM

## 2020-01-01 DIAGNOSIS — Z92.25 PERSONAL HISTORY OF IMMUNOSUPPRESSION THERAPY: ICD-10-CM

## 2020-01-01 DIAGNOSIS — R11.0 NAUSEA: ICD-10-CM

## 2020-01-01 DIAGNOSIS — K50.90 CROHN'S DISEASE, UNSPECIFIED, WITHOUT COMPLICATIONS: ICD-10-CM

## 2020-01-01 DIAGNOSIS — K59.1 FUNCTIONAL DIARRHEA: ICD-10-CM

## 2020-01-01 DIAGNOSIS — Z79.899 OTHER LONG TERM (CURRENT) DRUG THERAPY: ICD-10-CM

## 2020-01-01 DIAGNOSIS — K12.1 OTHER FORMS OF STOMATITIS: ICD-10-CM

## 2020-01-01 DIAGNOSIS — Z11.59 ENCOUNTER FOR SCREENING FOR OTHER VIRAL DISEASES: ICD-10-CM

## 2020-01-01 DIAGNOSIS — K29.50 UNSPECIFIED CHRONIC GASTRITIS WITHOUT BLEEDING: ICD-10-CM

## 2020-01-01 DIAGNOSIS — Z00.00 ENCOUNTER FOR GENERAL ADULT MEDICAL EXAMINATION WITHOUT ABNO: ICD-10-CM

## 2020-01-01 DIAGNOSIS — R10.32 LEFT LOWER QUADRANT PAIN: ICD-10-CM

## 2020-01-01 DIAGNOSIS — R10.10 UPPER ABDOMINAL PAIN, UNSPECIFIED: ICD-10-CM

## 2020-01-01 DIAGNOSIS — R10.13 EPIGASTRIC PAIN: ICD-10-CM

## 2020-01-01 DIAGNOSIS — Z93.9 ARTIFICIAL OPENING STATUS, UNSPECIFIED: ICD-10-CM

## 2020-01-01 DIAGNOSIS — R53.83 OTHER FATIGUE: ICD-10-CM

## 2020-01-01 DIAGNOSIS — K63.3 ULCER OF INTESTINE: ICD-10-CM

## 2020-01-01 LAB
GI HISTOLOGY: A. UNSPECIFIED: (no result)
GI HISTOLOGY: B. UNSPECIFIED: (no result)
GI HISTOLOGY: C. SELECT: (no result)
GI HISTOLOGY: D. SELECT: (no result)
GI HISTOLOGY: E. SELECT: (no result)
GI HISTOLOGY: F. SELECT: (no result)
GI HISTOLOGY: G. SELECT: (no result)
GI HISTOLOGY: PDF REPORT: (no result)

## 2020-01-01 PROCEDURE — 87633 RESP VIRUS 12-25 TARGETS: CPT | Performed by: INTERNAL MEDICINE

## 2020-01-01 PROCEDURE — U0002 COVID-19 LAB TEST NON-CDC: HCPCS | Performed by: INTERNAL MEDICINE

## 2020-01-01 PROCEDURE — 88305 TISSUE EXAM BY PATHOLOGIST: CPT | Performed by: INTERNAL MEDICINE

## 2020-01-01 PROCEDURE — 43239 EGD BIOPSY SINGLE/MULTIPLE: CPT | Mod: 59 | Performed by: INTERNAL MEDICINE

## 2020-01-01 PROCEDURE — 88342 IMHCHEM/IMCYTCHM 1ST ANTB: CPT | Performed by: INTERNAL MEDICINE

## 2020-01-01 PROCEDURE — 45380 COLONOSCOPY AND BIOPSY: CPT | Mod: PT | Performed by: INTERNAL MEDICINE

## 2020-01-01 PROCEDURE — 99213 OFFICE O/P EST LOW 20 MIN: CPT | Mod: 95 | Performed by: INTERNAL MEDICINE

## 2020-01-01 RX ORDER — PREDNISONE 10 MG/1
TABLET ORAL
Qty: 120 | Refills: 1 | Status: ACTIVE
Start: 2020-01-01

## 2020-01-03 LAB
BASOPHILS # BLD AUTO: 0.06 10*3/MM3 (ref 0–0.2)
BASOPHILS NFR BLD AUTO: 0.6 % (ref 0–1.5)
EBV EA IGG SER-ACNC: >150 U/ML (ref 0–8.9)
EBV NA IGG SER IA-ACNC: 61.8 U/ML (ref 0–17.9)
EBV VCA IGG SER IA-ACNC: >600 U/ML (ref 0–17.9)
EBV VCA IGM SER IA-ACNC: <36 U/ML (ref 0–35.9)
EOSINOPHIL # BLD AUTO: 0.09 10*3/MM3 (ref 0–0.4)
EOSINOPHIL NFR BLD AUTO: 0.9 % (ref 0.3–6.2)
ERYTHROCYTE [DISTWIDTH] IN BLOOD BY AUTOMATED COUNT: 12.8 % (ref 12.3–15.4)
HCT VFR BLD AUTO: 48.1 % (ref 34–46.6)
HGB BLD-MCNC: 16.2 G/DL (ref 12–15.9)
IMM GRANULOCYTES # BLD AUTO: 0.02 10*3/MM3 (ref 0–0.05)
IMM GRANULOCYTES NFR BLD AUTO: 0.2 % (ref 0–0.5)
LYMPHOCYTES # BLD AUTO: 2.96 10*3/MM3 (ref 0.7–3.1)
LYMPHOCYTES NFR BLD AUTO: 31.1 % (ref 19.6–45.3)
MCH RBC QN AUTO: 29 PG (ref 26.6–33)
MCHC RBC AUTO-ENTMCNC: 33.7 G/DL (ref 31.5–35.7)
MCV RBC AUTO: 86 FL (ref 79–97)
MONOCYTES # BLD AUTO: 0.61 10*3/MM3 (ref 0.1–0.9)
MONOCYTES NFR BLD AUTO: 6.4 % (ref 5–12)
NEUTROPHILS # BLD AUTO: 5.78 10*3/MM3 (ref 1.7–7)
NEUTROPHILS NFR BLD AUTO: 60.8 % (ref 42.7–76)
NRBC BLD AUTO-RTO: 0.1 /100 WBC (ref 0–0.2)
PLATELET # BLD AUTO: 383 10*3/MM3 (ref 140–450)
RBC # BLD AUTO: 5.59 10*6/MM3 (ref 3.77–5.28)
SERVICE CMNT-IMP: ABNORMAL
WBC # BLD AUTO: 9.52 10*3/MM3 (ref 3.4–10.8)

## 2020-01-08 LAB
25(OH)D3+25(OH)D2 SERPL-MCNC: 36.3 NG/ML (ref 30–100)
ACTH PLAS-MCNC: 4.3 PG/ML (ref 7.2–63.3)
ALBUMIN SERPL-MCNC: 4.6 G/DL (ref 3.5–5.5)
ALBUMIN/GLOB SERPL: 1.6 {RATIO} (ref 1.2–2.2)
ALP SERPL-CCNC: 91 IU/L (ref 39–117)
ALT SERPL-CCNC: 74 IU/L (ref 0–32)
AST SERPL-CCNC: 55 IU/L (ref 0–40)
BILIRUB SERPL-MCNC: 0.4 MG/DL (ref 0–1.2)
BUN SERPL-MCNC: 8 MG/DL (ref 6–20)
BUN/CREAT SERPL: 11 (ref 9–23)
C PEPTIDE SERPL-MCNC: 13.7 NG/ML (ref 1.1–4.4)
CALCIUM SERPL-MCNC: 9.7 MG/DL (ref 8.7–10.2)
CHLORIDE SERPL-SCNC: 100 MMOL/L (ref 96–106)
CHOLEST SERPL-MCNC: 232 MG/DL (ref 100–199)
CO2 SERPL-SCNC: 23 MMOL/L (ref 20–29)
CORTIS SERPL-MCNC: 2 UG/DL
CREAT SERPL-MCNC: 0.73 MG/DL (ref 0.57–1)
FSH SERPL-ACNC: 4.3 MIU/ML
GLOBULIN SER CALC-MCNC: 2.8 G/DL (ref 1.5–4.5)
GLUCOSE SERPL-MCNC: 152 MG/DL (ref 65–99)
HBA1C MFR BLD: 5.3 % (ref 4.8–5.6)
HDLC SERPL-MCNC: 43 MG/DL
INTERPRETATION: NORMAL
LDLC SERPL CALC-MCNC: ABNORMAL MG/DL (ref 0–99)
LH SERPL-ACNC: 2.5 MIU/ML
POTASSIUM SERPL-SCNC: 3.8 MMOL/L (ref 3.5–5.2)
PROLACTIN SERPL-MCNC: 13.8 NG/ML (ref 4.8–23.3)
PROT SERPL-MCNC: 7.4 G/DL (ref 6–8.5)
SODIUM SERPL-SCNC: 140 MMOL/L (ref 134–144)
T3FREE SERPL-MCNC: 3.3 PG/ML (ref 2–4.4)
T4 FREE SERPL-MCNC: 1.82 NG/DL (ref 0.82–1.77)
T4 SERPL-MCNC: 9 UG/DL (ref 4.5–12)
THYROGLOB AB SERPL-ACNC: 3.1 IU/ML (ref 0–0.9)
THYROGLOB SERPL-MCNC: 6.8 NG/ML
TRIGL SERPL-MCNC: 410 MG/DL (ref 0–149)
TSH SERPL DL<=0.005 MIU/L-ACNC: 0.31 UIU/ML (ref 0.45–4.5)
URATE SERPL-MCNC: 6.6 MG/DL (ref 2.5–7.1)
VLDLC SERPL CALC-MCNC: ABNORMAL MG/DL (ref 5–40)

## 2020-01-28 RX ORDER — SUMATRIPTAN 50 MG/1
TABLET, FILM COATED ORAL
Qty: 9 TABLET | Refills: 0 | Status: SHIPPED | OUTPATIENT
Start: 2020-01-28 | End: 2020-02-26

## 2020-02-13 ENCOUNTER — OFFICE VISIT (OUTPATIENT)
Dept: FAMILY MEDICINE CLINIC | Facility: CLINIC | Age: 30
End: 2020-02-13

## 2020-02-13 VITALS
RESPIRATION RATE: 16 BRPM | HEIGHT: 65 IN | SYSTOLIC BLOOD PRESSURE: 110 MMHG | OXYGEN SATURATION: 97 % | BODY MASS INDEX: 30.66 KG/M2 | WEIGHT: 184 LBS | TEMPERATURE: 97.8 F | HEART RATE: 78 BPM | DIASTOLIC BLOOD PRESSURE: 68 MMHG

## 2020-02-13 DIAGNOSIS — K50.018 CROHN'S DISEASE OF SMALL INTESTINE WITH OTHER COMPLICATION (HCC): ICD-10-CM

## 2020-02-13 DIAGNOSIS — F41.9 ANXIETY: ICD-10-CM

## 2020-02-13 DIAGNOSIS — R79.89 LFT ELEVATION: ICD-10-CM

## 2020-02-13 DIAGNOSIS — R79.89 ABNORMAL CBC: Primary | ICD-10-CM

## 2020-02-13 DIAGNOSIS — F33.1 MODERATE EPISODE OF RECURRENT MAJOR DEPRESSIVE DISORDER (HCC): ICD-10-CM

## 2020-02-13 DIAGNOSIS — R79.89 LOW SERUM CORTISOL LEVEL: ICD-10-CM

## 2020-02-13 DIAGNOSIS — E61.1 IRON DEFICIENCY: ICD-10-CM

## 2020-02-13 PROCEDURE — 99213 OFFICE O/P EST LOW 20 MIN: CPT | Performed by: PHYSICIAN ASSISTANT

## 2020-02-13 RX ORDER — ESCITALOPRAM OXALATE 20 MG/1
TABLET ORAL
Qty: 90 TABLET | Refills: 0 | Status: SHIPPED | OUTPATIENT
Start: 2020-02-13 | End: 2020-08-25

## 2020-02-13 NOTE — PROGRESS NOTES
"Subjective   Cortney White is a 29 y.o. female.     History of Present Illness   Cortney White 29 y.o. female /68 (BP Location: Left arm, Patient Position: Sitting, Cuff Size: Large Adult)   Pulse 78   Temp 97.8 °F (36.6 °C) (Oral)   Resp 16   Ht 165.1 cm (65\")   Wt 83.5 kg (184 lb)   SpO2 97%   BMI 30.62 kg/m²  who presents today for anxiety and f/u labs  she has a history of   Patient Active Problem List   Diagnosis   • Anxiety   • Moderate episode of recurrent major depressive disorder (CMS/HCC)   • Low serum cortisol level (CMS/HCC)   • Iron deficiency   • H/O total colectomy   • Migraine with aura and without status migrainosus, not intractable   • Crohn's disease (CMS/HCC)   • Heat intolerance   • Chronic fatigue   • Vitamin D deficiency   • Hyperglycemia   • Dyslipidemia   • Primary hypothyroidism   • Abdominal pain   • B12 deficiency   • Chronic pain disorder   • Exacerbation of Crohn's disease of small intestine with complication (CMS/HCC)   • H/O iron deficiency   • Numbness and tingling in both hands   • Parastomal hernia   • Severe episode of recurrent major depressive disorder, without psychotic features (CMS/HCC)   see endocrine about thyroid and cortisol      She is taking iron;   I did want to see her today to talk about her hemoglobin hematocrit and red blood cells being elevated on the last labs in the lab before this.  I know she takes iron and will have her do a repeat CBC today and get her iron studies with iron profile and ferritin.  I will then talk to hematology to see if we need further work-up.  She denies sleep apnea or observed apnea.  No history of asthma  She is taking iron; ? Need it?  Concern with CBC    No help with 30mg Lexapro for anxiety and depression; will go back down to 20mg and better for liver    The following portions of the patient's history were reviewed and updated as appropriate: allergies, current medications, past family history, past medical " history, past social history, past surgical history and problem list.    Review of Systems   Constitutional: Positive for fatigue. Negative for activity change, appetite change and unexpected weight change.   HENT: Positive for rhinorrhea and sinus pain. Negative for nosebleeds and trouble swallowing.    Eyes: Positive for photophobia. Negative for pain and visual disturbance.   Respiratory: Negative for chest tightness, shortness of breath and wheezing.    Cardiovascular: Negative for chest pain and palpitations.   Gastrointestinal: Negative for abdominal pain and blood in stool.   Endocrine: Positive for heat intolerance.   Genitourinary: Negative for difficulty urinating and hematuria.   Musculoskeletal: Negative for joint swelling.   Skin: Negative for color change and rash.   Allergic/Immunologic: Negative.    Neurological: Negative for syncope and speech difficulty.   Hematological: Negative for adenopathy.   Psychiatric/Behavioral: Negative for agitation and confusion.   All other systems reviewed and are negative.      Objective   Physical Exam   Constitutional: She is oriented to person, place, and time. She appears well-developed and well-nourished. No distress.   HENT:   Head: Normocephalic and atraumatic.   Eyes: Pupils are equal, round, and reactive to light. Conjunctivae and EOM are normal. Right eye exhibits no discharge. Left eye exhibits no discharge. No scleral icterus.   Neck: Normal range of motion. Neck supple. No tracheal deviation present. No thyromegaly present.   Cardiovascular: Normal rate, regular rhythm, normal heart sounds, intact distal pulses and normal pulses. Exam reveals no gallop.   No murmur heard.  Pulmonary/Chest: Effort normal and breath sounds normal. No respiratory distress. She has no wheezes. She has no rales.   Musculoskeletal: Normal range of motion.   Neurological: She is alert and oriented to person, place, and time. She exhibits normal muscle tone. Coordination normal.    Skin: Skin is warm. No rash noted. No erythema. No pallor.   Psychiatric: She has a normal mood and affect. Her behavior is normal. Judgment and thought content normal.   Nursing note and vitals reviewed.      Assessment/Plan   Cortney was seen today for anxiety.    Diagnoses and all orders for this visit:    Abnormal CBC  -     CBC & Differential  -     Iron Profile  -     Ferritin    LFT elevation  -     CBC & Differential  -     Iron Profile  -     Ferritin    Anxiety    Moderate episode of recurrent major depressive disorder (CMS/HCC)    Iron deficiency    Low serum cortisol level (CMS/HCC)    Crohn's disease of small intestine with other complication (CMS/HCC)    Other orders  -     escitalopram (LEXAPRO) 20 MG tablet; 1 PO daily for stress (new dose)    I am watching her liver enzymes as well as Dr. Senior and will lower the Lexapro dose    We will lower the Lexapro dose due to not helpful being at 30 mg go down to 20 mg also due to liver enzymes being elevated  Need to follow-up on hemoglobin and hematocrit elevation also RBC elevation.  Need to get iron studies she is taking iron need to see if it is too much??  Check if snoring  Follow-up with Dr. Doty for the Crohn's but also for the fatty liver  She needs to see endocrine about the cortisol

## 2020-02-13 NOTE — PATIENT INSTRUCTIONS

## 2020-02-25 ENCOUNTER — OFFICE (AMBULATORY)
Dept: URBAN - METROPOLITAN AREA CLINIC 75 | Facility: CLINIC | Age: 30
End: 2020-02-25

## 2020-02-25 VITALS
SYSTOLIC BLOOD PRESSURE: 118 MMHG | WEIGHT: 186 LBS | HEART RATE: 84 BPM | DIASTOLIC BLOOD PRESSURE: 70 MMHG | HEIGHT: 65 IN

## 2020-02-25 DIAGNOSIS — K50.90 CROHN'S DISEASE, UNSPECIFIED, WITHOUT COMPLICATIONS: ICD-10-CM

## 2020-02-25 DIAGNOSIS — R53.83 OTHER FATIGUE: ICD-10-CM

## 2020-02-25 DIAGNOSIS — Z92.25 PERSONAL HISTORY OF IMMUNOSUPPRESSION THERAPY: ICD-10-CM

## 2020-02-25 DIAGNOSIS — R10.32 LEFT LOWER QUADRANT PAIN: ICD-10-CM

## 2020-02-25 DIAGNOSIS — D51.9 VITAMIN B12 DEFICIENCY ANEMIA, UNSPECIFIED: ICD-10-CM

## 2020-02-25 PROCEDURE — 99214 OFFICE O/P EST MOD 30 MIN: CPT

## 2020-02-26 RX ORDER — LEVOTHYROXINE SODIUM 75 UG/1
75 TABLET ORAL DAILY
Qty: 30 TABLET | Refills: 0 | Status: SHIPPED | OUTPATIENT
Start: 2020-02-26 | End: 2020-03-30 | Stop reason: SDUPTHER

## 2020-02-26 RX ORDER — SUMATRIPTAN 50 MG/1
TABLET, FILM COATED ORAL
Qty: 9 TABLET | Refills: 0 | Status: SHIPPED | OUTPATIENT
Start: 2020-02-26 | End: 2020-04-14 | Stop reason: SDUPTHER

## 2020-03-05 LAB
BASOPHILS # BLD AUTO: 0.07 10*3/MM3 (ref 0–0.2)
BASOPHILS NFR BLD AUTO: 0.9 % (ref 0–1.5)
EOSINOPHIL # BLD AUTO: 0.25 10*3/MM3 (ref 0–0.4)
EOSINOPHIL NFR BLD AUTO: 3.4 % (ref 0.3–6.2)
ERYTHROCYTE [DISTWIDTH] IN BLOOD BY AUTOMATED COUNT: 13 % (ref 12.3–15.4)
FERRITIN SERPL-MCNC: 135 NG/ML (ref 13–150)
HCT VFR BLD AUTO: 44.5 % (ref 34–46.6)
HGB BLD-MCNC: 14.6 G/DL (ref 12–15.9)
IMM GRANULOCYTES # BLD AUTO: 0.04 10*3/MM3 (ref 0–0.05)
IMM GRANULOCYTES NFR BLD AUTO: 0.5 % (ref 0–0.5)
IRON SATN MFR SERPL: 17 % (ref 20–50)
IRON SERPL-MCNC: 73 MCG/DL (ref 37–145)
LYMPHOCYTES # BLD AUTO: 3.09 10*3/MM3 (ref 0.7–3.1)
LYMPHOCYTES NFR BLD AUTO: 41.8 % (ref 19.6–45.3)
MCH RBC QN AUTO: 29.4 PG (ref 26.6–33)
MCHC RBC AUTO-ENTMCNC: 32.8 G/DL (ref 31.5–35.7)
MCV RBC AUTO: 89.5 FL (ref 79–97)
MONOCYTES # BLD AUTO: 0.64 10*3/MM3 (ref 0.1–0.9)
MONOCYTES NFR BLD AUTO: 8.6 % (ref 5–12)
NEUTROPHILS # BLD AUTO: 3.31 10*3/MM3 (ref 1.7–7)
NEUTROPHILS NFR BLD AUTO: 44.8 % (ref 42.7–76)
NRBC BLD AUTO-RTO: 0 /100 WBC (ref 0–0.2)
PLATELET # BLD AUTO: 304 10*3/MM3 (ref 140–450)
RBC # BLD AUTO: 4.97 10*6/MM3 (ref 3.77–5.28)
TIBC SERPL-MCNC: 438 MCG/DL
UIBC SERPL-MCNC: 438 MCG/DL (ref 112–346)
WBC # BLD AUTO: 7.4 10*3/MM3 (ref 3.4–10.8)

## 2020-03-30 ENCOUNTER — OFFICE VISIT (OUTPATIENT)
Dept: ENDOCRINOLOGY | Age: 30
End: 2020-03-30

## 2020-03-30 VITALS
HEIGHT: 65 IN | BODY MASS INDEX: 31.39 KG/M2 | DIASTOLIC BLOOD PRESSURE: 60 MMHG | WEIGHT: 188.4 LBS | SYSTOLIC BLOOD PRESSURE: 100 MMHG

## 2020-03-30 DIAGNOSIS — R73.9 HYPERGLYCEMIA: ICD-10-CM

## 2020-03-30 DIAGNOSIS — R79.89 LFT ELEVATION: ICD-10-CM

## 2020-03-30 DIAGNOSIS — R79.89 LOW SERUM CORTISOL LEVEL: ICD-10-CM

## 2020-03-30 DIAGNOSIS — E55.9 VITAMIN D DEFICIENCY: ICD-10-CM

## 2020-03-30 DIAGNOSIS — R53.82 CHRONIC FATIGUE: ICD-10-CM

## 2020-03-30 DIAGNOSIS — R63.5 ABNORMAL WEIGHT GAIN: ICD-10-CM

## 2020-03-30 DIAGNOSIS — E78.5 DYSLIPIDEMIA: ICD-10-CM

## 2020-03-30 DIAGNOSIS — E03.9 PRIMARY HYPOTHYROIDISM: Primary | ICD-10-CM

## 2020-03-30 PROBLEM — B37.31 VAGINAL CANDIDA: Status: ACTIVE | Noted: 2020-03-30

## 2020-03-30 PROCEDURE — 99214 OFFICE O/P EST MOD 30 MIN: CPT | Performed by: NURSE PRACTITIONER

## 2020-03-30 RX ORDER — FLUCONAZOLE 150 MG/1
150 TABLET ORAL ONCE
Qty: 7 TABLET | Refills: 1 | Status: SHIPPED | OUTPATIENT
Start: 2020-03-30 | End: 2020-03-30

## 2020-03-30 RX ORDER — LEVOTHYROXINE SODIUM 75 UG/1
75 TABLET ORAL DAILY
Qty: 90 TABLET | Refills: 1 | Status: SHIPPED | OUTPATIENT
Start: 2020-03-30 | End: 2020-11-02

## 2020-03-30 NOTE — PROGRESS NOTES
"Subjective   Cortney White is a 30 y.o. female is here today for follow-up.  Chief Complaint   Patient presents with   • Hypothyroidism   • Vitamin D Deficiency   • Fatigue   • Hypoglycemia     /60 (BP Location: Right arm, Patient Position: Sitting, Cuff Size: Adult)   Ht 165.1 cm (65\")   Wt 85.5 kg (188 lb 6.4 oz)   BMI 31.35 kg/m²   Current Outpatient Medications on File Prior to Visit   Medication Sig   • cetirizine (zyrTEC) 10 MG tablet Take 10 mg by mouth.   • cyanocobalamin 1000 MCG/ML injection Inject 1,000 mcg into the appropriate muscle as directed by prescriber.   • ergocalciferol (ERGOCALCIFEROL) 29258 units capsule Take 1 capsule by mouth 2 (Two) Times a Week.   • escitalopram (LEXAPRO) 20 MG tablet 1 PO daily for stress (new dose)   • ferrous sulfate 325 (65 FE) MG tablet Take 325 mg by mouth Daily With Breakfast.   • loperamide (IMODIUM) 2 MG capsule TK 2 TO 3 CS PO QID 1/2 HOUR BEFORE MEALS AND 1/2 HOUR BEFORE BEDTIME   • LORazepam (ATIVAN) 0.5 MG tablet TK 1 T PO  TID   • Multiple Vitamin (MULTI-VITAMIN DAILY PO) Take  by mouth.   • ondansetron ODT (ZOFRAN-ODT) 4 MG disintegrating tablet Take 4 mg by mouth Every 8 (Eight) Hours As Needed for Nausea or Vomiting.   • pantoprazole (PROTONIX) 40 MG EC tablet Take 40 mg by mouth Daily.   • SUMAtriptan (IMITREX) 50 MG tablet TAKE 1 TABLET BY MOUTH 1 TIME FOR UP TO 1 DOSE AS NEEDED FOR MIGRAINE. MAY REPEAT DOSE FOR 1 AFTER 2 HOURS IF HEADACHE CONTINUES   • Ustekinumab (STELARA) 90 MG/ML solution prefilled syringe Injection    • VASCEPA 1 g capsule capsule Take 2 g by mouth 2 (Two) Times a Day With Meals. For trigs   • [DISCONTINUED] UNITHROID 75 MCG tablet TAKE 1 TABLET BY MOUTH DAILY   • acetaminophen-codeine (TYLENOL #3) 300-30 MG per tablet    • azithromycin (ZITHROMAX Z-KRISSY) 250 MG tablet Take 2 tablets the first day, then 1 tablet daily for 4 days.   • cholecalciferol (VITAMIN D3) 1000 units tablet Take 2,000 Units by mouth Daily.   • " Vedolizumab (ENTYVIO IV) Infuse  into a venous catheter.   • vedolizumab (ENTYVIO) 300 MG injection    • [DISCONTINUED] medroxyPROGESTERone (DEPO-PROVERA) 150 MG/ML injection Inject 150 mg into the appropriate muscle as directed by prescriber.     No current facility-administered medications on file prior to visit.      Family History   Problem Relation Age of Onset   • Rheum arthritis Mother    • Cancer Maternal Grandmother    • Kidney disease Maternal Grandfather    • Kidney disease Paternal Grandfather      Social History     Tobacco Use   • Smoking status: Never Smoker   • Smokeless tobacco: Never Used   Substance Use Topics   • Alcohol use: No   • Drug use: Not on file     Allergies   Allergen Reactions   • Dicyclomine Other (See Comments) and Shortness Of Breath     DIZZINESS   • Pepcid [Famotidine] Anaphylaxis   • Reglan [Metoclopramide] Anaphylaxis   • Bentyl [Dicyclomine Hcl]    • Ciprofloxacin    • Diphenhydramine Other (See Comments)     Agitation    • Flagyl [Metronidazole]    • Morphine Itching   • Morphine And Related    • Mycophenolate    • Nsaids Other (See Comments)     C/i with CROHNS   • Phenergan [Promethazine Hcl]    • Promethazine Confusion     Pt reports feeling confused and having trouble swallowing when taking medicine on last visit approx 3 weeks ago. Says she has not previously had an allergic reaction and pt says she has taken phenergan many times before with no problem   • Sulfa Antibiotics          History of Present Illness   Encounter Diagnoses   Name Primary?   • Dyslipidemia    • Low serum cortisol level (CMS/HCC)    • Primary hypothyroidism Yes   • Vitamin D deficiency    • LFT elevation    • Chronic fatigue    • Abnormal weight gain      This is a 30-year-old female patient here today for follow-up visit.  She is accompanied by her mother.  She has been seen for the above mentioned diagnoses.  Her labs from her previous visit with Dr. Senior were reviewed.  She had low cortisol  level.  She has Crohn's disease and has a colonoscopy.  She is immunocompromised because of her disease and current medications.  She is temperature intolerant and has problems with tolerating heat.  She has hypothyroid and in the past has not always taken her medication on empty stomach or consistently.  She has been advised to take her thyroid hormone daily consistently on empty stomach.  She is on medications that can also interfere with the absorption.  She has been going to [facility] for depression which she states has not been helping so she is considering going back to a previous counselor.  She has been advised to call and schedule an appointment.  She has a list of questions and concerns regarding her visit at today's visit unfortunately all her questions and concerns revolve results of her laboratory evaluation.  She has been advised that in the future she can have labs done prior to her visit.  She did have labs done at today's visit will be notified of the results along with any further recommendations.  Patient has been on Depo-Provera in the past for birth control.  She is currently not sexually active.  She was told by Dr. Senior to get off this medication to see if she would restart her periods.  She has not restarted her menses.  She wants to get back on the Depo because she states it helps her      The following portions of the patient's history were reviewed and updated as appropriate: allergies, current medications, past family history, past medical history, past social history, past surgical history and problem list.    Review of Systems   Constitutional: Positive for appetite change and fatigue.   Eyes: Negative for visual disturbance.   Respiratory: Negative for cough.    Cardiovascular: Negative for leg swelling.   Gastrointestinal: Positive for diarrhea. Negative for constipation.   Endocrine: Positive for heat intolerance. Negative for cold intolerance, polydipsia, polyphagia and  polyuria.   Genitourinary: Negative for frequency.   Neurological: Negative for numbness.       Objective   Physical Exam   Constitutional: She is oriented to person, place, and time. She appears well-developed and well-nourished. No distress.   HENT:   Head: Normocephalic and atraumatic.   Right Ear: External ear normal.   Left Ear: External ear normal.   Nose: Nose normal.   Mouth/Throat: Oropharynx is clear and moist. No oropharyngeal exudate.   Eyes: Pupils are equal, round, and reactive to light. EOM are normal. Right eye exhibits no discharge. Left eye exhibits no discharge.   Neck: Trachea normal, normal range of motion and full passive range of motion without pain. Neck supple. No tracheal tenderness present. Carotid bruit is not present. No tracheal deviation, no edema and no erythema present. No thyroid mass and no thyromegaly present.   Cardiovascular: Normal rate, regular rhythm, normal heart sounds and intact distal pulses. Exam reveals no gallop and no friction rub.   No murmur heard.  Pulmonary/Chest: Effort normal and breath sounds normal. No stridor. No respiratory distress. She has no wheezes. She has no rales.   Abdominal: Soft. Bowel sounds are normal. She exhibits no distension.   Musculoskeletal: Normal range of motion. She exhibits no edema or deformity.   Lymphadenopathy:     She has no cervical adenopathy.   Neurological: She is alert and oriented to person, place, and time.   Skin: Skin is warm and dry. No rash noted. She is not diaphoretic. No erythema. No pallor.   Psychiatric: She has a normal mood and affect. Her behavior is normal. Judgment and thought content normal.   Nursing note and vitals reviewed.    Lab Results   Component Value Date    HGBA1C 5.3 01/02/2020     Lab Results   Component Value Date    GLUCOSE 78 12/15/2017    BUN 8 01/02/2020    CREATININE 0.73 01/02/2020    EGFRIFNONA 112 01/02/2020    EGFRIFAFRI 130 01/02/2020    BCR 11 01/02/2020    K 3.8 01/02/2020    CO2 23  01/02/2020    CALCIUM 9.7 01/02/2020    PROTENTOTREF 7.4 01/02/2020    ALBUMIN 4.6 01/02/2020    LABIL2 1.6 01/02/2020    AST 55 (H) 01/02/2020    ALT 74 (H) 01/02/2020     Lab Results   Component Value Date    CHLPL 232 (H) 01/02/2020    TRIG 410 (H) 01/02/2020    HDL 43 01/02/2020    LDL Comment 01/02/2020         Assessment/Plan   Problems Addressed this Visit        Digestive    Vitamin D deficiency       Endocrine    Primary hypothyroidism - Primary    Relevant Medications    UNITHROID 75 MCG tablet       Other    Low serum cortisol level (CMS/HCC)    Chronic fatigue    Dyslipidemia    LFT elevation    Abnormal weight gain          In summary patient was seen and examined.  Metabolically and clinically she presents stable however she will have extensive labs done at today's visit will be notified of the results along with any further recommendations.  She had a list of questions regarding all her diagnoses as well as her medications.  She has been advised to take her thyroid medication daily consistently on empty stomach.  She was given Diflucan prescription for vaginal candidiasis.  She is following up with counseling for her depression.  Her medication list was reviewed and updated.  She will be notified of her lab results along with any further changes.  She is to follow-up with Dr. Seniro her next visit.  She did have abnormal cortisol level on previous labs.  She does have a history of fatty liver according to abdominal CT scan.  She has had weight gain.  She eats overall fairly healthy according to patient.  She has a history of high triglyceride and is taking Vascepa.  She is not currently on statin therapy.  Her last thyroid hormones were in satisfactory range.  Her blood pressures in satisfactory range.  She is immunocompromise due to Crohn's disease.

## 2020-03-31 LAB
25(OH)D3+25(OH)D2 SERPL-MCNC: 40.9 NG/ML (ref 30–100)
ACTH PLAS-MCNC: 3 PG/ML (ref 7.2–63.3)
ALBUMIN SERPL-MCNC: 4.5 G/DL (ref 3.5–5.2)
ALBUMIN/GLOB SERPL: 1.6 G/DL
ALP SERPL-CCNC: 99 U/L (ref 39–117)
ALT SERPL-CCNC: 75 U/L (ref 1–33)
AST SERPL-CCNC: 51 U/L (ref 1–32)
BILIRUB SERPL-MCNC: 0.4 MG/DL (ref 0.2–1.2)
BUN SERPL-MCNC: 11 MG/DL (ref 6–20)
BUN/CREAT SERPL: 15.5 (ref 7–25)
C PEPTIDE SERPL-MCNC: 15.8 NG/ML (ref 1.1–4.4)
CALCIUM SERPL-MCNC: 9.7 MG/DL (ref 8.6–10.5)
CHLORIDE SERPL-SCNC: 101 MMOL/L (ref 98–107)
CHOLEST SERPL-MCNC: 209 MG/DL (ref 0–200)
CO2 SERPL-SCNC: 25.1 MMOL/L (ref 22–29)
CREAT SERPL-MCNC: 0.71 MG/DL (ref 0.57–1)
FT4I SERPL CALC-MCNC: 2.6 (ref 1.2–4.9)
GLOBULIN SER CALC-MCNC: 2.8 GM/DL
GLUCOSE SERPL-MCNC: 135 MG/DL (ref 65–99)
HBA1C MFR BLD: 5.2 % (ref 4.8–5.6)
HDLC SERPL-MCNC: 45 MG/DL (ref 40–60)
INTERPRETATION: NORMAL
LDLC SERPL CALC-MCNC: ABNORMAL MG/DL
Lab: NORMAL
MICROALBUMIN UR-MCNC: 11.3 UG/ML
POTASSIUM SERPL-SCNC: 4.1 MMOL/L (ref 3.5–5.2)
PROT SERPL-MCNC: 7.3 G/DL (ref 6–8.5)
SODIUM SERPL-SCNC: 140 MMOL/L (ref 136–145)
T3FREE SERPL-MCNC: 2.7 PG/ML (ref 2–4.4)
T3RU NFR SERPL: 29 % (ref 24–39)
T4 FREE SERPL-MCNC: 1.57 NG/DL (ref 0.93–1.7)
T4 SERPL-MCNC: 9.1 UG/DL (ref 4.5–12)
TRIGL SERPL-MCNC: 460 MG/DL (ref 0–150)
TSH SERPL DL<=0.005 MIU/L-ACNC: 2.04 UIU/ML (ref 0.45–4.5)
VLDLC SERPL CALC-MCNC: ABNORMAL MG/DL

## 2020-04-01 LAB
CORTIS SERPL-MCNC: 2 UG/DL
WRITTEN AUTHORIZATION: NORMAL

## 2020-04-14 RX ORDER — SUMATRIPTAN 50 MG/1
TABLET, FILM COATED ORAL
Qty: 9 TABLET | Refills: 5 | Status: SHIPPED | OUTPATIENT
Start: 2020-04-14 | End: 2020-12-24 | Stop reason: SDUPTHER

## 2020-05-28 ENCOUNTER — TELEPHONE (OUTPATIENT)
Dept: FAMILY MEDICINE CLINIC | Facility: CLINIC | Age: 30
End: 2020-05-28

## 2020-05-28 NOTE — TELEPHONE ENCOUNTER
----- Message from Rosalie Ventura PA-C sent at 5/11/2020  7:52 AM EDT -----  Needs f/u and to see GYN for possible hemorrhagic ovarian cyst. Must have f/u  ----- Message -----  From: Sue Bhakta  Sent: 5/11/2020   7:21 AM EDT  To: Rosalie Ventura PA-C

## 2020-06-30 RX ORDER — ERGOCALCIFEROL 1.25 MG/1
CAPSULE ORAL
Qty: 26 CAPSULE | Refills: 3 | Status: SHIPPED | OUTPATIENT
Start: 2020-06-30 | End: 2021-02-26 | Stop reason: SDUPTHER

## 2020-08-18 ENCOUNTER — LAB (OUTPATIENT)
Dept: ENDOCRINOLOGY | Age: 30
End: 2020-08-18

## 2020-08-18 DIAGNOSIS — E55.9 VITAMIN D DEFICIENCY: ICD-10-CM

## 2020-08-18 DIAGNOSIS — R53.82 CHRONIC FATIGUE: ICD-10-CM

## 2020-08-18 DIAGNOSIS — R73.9 HYPERGLYCEMIA: ICD-10-CM

## 2020-08-18 DIAGNOSIS — E03.9 PRIMARY HYPOTHYROIDISM: ICD-10-CM

## 2020-08-18 DIAGNOSIS — R79.89 LOW SERUM CORTISOL LEVEL: ICD-10-CM

## 2020-08-18 DIAGNOSIS — E55.9 VITAMIN D DEFICIENCY: Primary | ICD-10-CM

## 2020-08-19 LAB
25(OH)D3+25(OH)D2 SERPL-MCNC: 42.8 NG/ML (ref 30–100)
ACTH PLAS-MCNC: 4.2 PG/ML (ref 7.2–63.3)
ALBUMIN SERPL-MCNC: 4.4 G/DL (ref 3.5–5.2)
ALBUMIN/CREAT UR: 4 MG/G CREAT (ref 0–29)
ALBUMIN/GLOB SERPL: 2.2 G/DL
ALP SERPL-CCNC: 90 U/L (ref 39–117)
ALT SERPL-CCNC: 45 U/L (ref 1–33)
AST SERPL-CCNC: 45 U/L (ref 1–32)
BILIRUB SERPL-MCNC: 0.4 MG/DL (ref 0–1.2)
BUN SERPL-MCNC: 5 MG/DL (ref 6–20)
BUN/CREAT SERPL: 7.7 (ref 7–25)
C PEPTIDE SERPL-MCNC: 5.7 NG/ML (ref 1.1–4.4)
CALCIUM SERPL-MCNC: 9.4 MG/DL (ref 8.6–10.5)
CHLORIDE SERPL-SCNC: 100 MMOL/L (ref 98–107)
CHOLEST SERPL-MCNC: 196 MG/DL (ref 0–200)
CO2 SERPL-SCNC: 28.1 MMOL/L (ref 22–29)
CORTIS SERPL-MCNC: 6.5 UG/DL
CREAT SERPL-MCNC: 0.65 MG/DL (ref 0.57–1)
CREAT UR-MCNC: 251.6 MG/DL
FT4I SERPL CALC-MCNC: 2.1 (ref 1.2–4.9)
GLOBULIN SER CALC-MCNC: 2 GM/DL
GLUCOSE SERPL-MCNC: 95 MG/DL (ref 65–99)
HBA1C MFR BLD: 5.3 % (ref 4.8–5.6)
HDLC SERPL-MCNC: 40 MG/DL (ref 40–60)
INTERPRETATION: NORMAL
LDLC SERPL CALC-MCNC: 92 MG/DL (ref 0–100)
Lab: NORMAL
MICROALBUMIN UR-MCNC: 9.8 UG/ML
POTASSIUM SERPL-SCNC: 4 MMOL/L (ref 3.5–5.2)
PROT SERPL-MCNC: 6.4 G/DL (ref 6–8.5)
SODIUM SERPL-SCNC: 139 MMOL/L (ref 136–145)
T3FREE SERPL-MCNC: 4 PG/ML (ref 2–4.4)
T3RU NFR SERPL: 27 % (ref 24–39)
T4 FREE SERPL-MCNC: 1.5 NG/DL (ref 0.93–1.7)
T4 SERPL-MCNC: 7.8 UG/DL (ref 4.5–12)
TRIGL SERPL-MCNC: 322 MG/DL (ref 0–150)
TSH SERPL DL<=0.005 MIU/L-ACNC: 0.72 UIU/ML (ref 0.45–4.5)
VLDLC SERPL CALC-MCNC: 64.4 MG/DL

## 2020-08-25 ENCOUNTER — OFFICE VISIT (OUTPATIENT)
Dept: FAMILY MEDICINE CLINIC | Facility: CLINIC | Age: 30
End: 2020-08-25

## 2020-08-25 VITALS
OXYGEN SATURATION: 96 % | TEMPERATURE: 97.2 F | HEART RATE: 73 BPM | HEIGHT: 65 IN | BODY MASS INDEX: 32.49 KG/M2 | SYSTOLIC BLOOD PRESSURE: 110 MMHG | DIASTOLIC BLOOD PRESSURE: 68 MMHG | RESPIRATION RATE: 16 BRPM | WEIGHT: 195 LBS

## 2020-08-25 DIAGNOSIS — R30.0 DYSURIA: Primary | ICD-10-CM

## 2020-08-25 DIAGNOSIS — F33.1 MODERATE EPISODE OF RECURRENT MAJOR DEPRESSIVE DISORDER (HCC): ICD-10-CM

## 2020-08-25 DIAGNOSIS — N30.01 ACUTE CYSTITIS WITH HEMATURIA: ICD-10-CM

## 2020-08-25 DIAGNOSIS — F41.9 ANXIETY: ICD-10-CM

## 2020-08-25 DIAGNOSIS — B37.9 YEAST INFECTION: ICD-10-CM

## 2020-08-25 LAB
BILIRUB BLD-MCNC: NEGATIVE MG/DL
CLARITY, POC: CLEAR
COLOR UR: YELLOW
GLUCOSE UR STRIP-MCNC: NEGATIVE MG/DL
KETONES UR QL: NEGATIVE
LEUKOCYTE EST, POC: NEGATIVE
NITRITE UR-MCNC: NEGATIVE MG/ML
PH UR: 6 [PH] (ref 5–8)
PROT UR STRIP-MCNC: NEGATIVE MG/DL
RBC # UR STRIP: ABNORMAL /UL
SP GR UR: 1.02 (ref 1–1.03)
UROBILINOGEN UR QL: NORMAL

## 2020-08-25 PROCEDURE — 99213 OFFICE O/P EST LOW 20 MIN: CPT | Performed by: PHYSICIAN ASSISTANT

## 2020-08-25 PROCEDURE — G0439 PPPS, SUBSEQ VISIT: HCPCS | Performed by: PHYSICIAN ASSISTANT

## 2020-08-25 PROCEDURE — 81003 URINALYSIS AUTO W/O SCOPE: CPT | Performed by: PHYSICIAN ASSISTANT

## 2020-08-25 RX ORDER — PHENAZOPYRIDINE HYDROCHLORIDE 200 MG/1
200 TABLET, FILM COATED ORAL 3 TIMES DAILY PRN
Qty: 9 TABLET | Refills: 0 | Status: SHIPPED | OUTPATIENT
Start: 2020-08-25 | End: 2020-09-30

## 2020-08-25 RX ORDER — FLUOXETINE HYDROCHLORIDE 20 MG/1
CAPSULE ORAL
Qty: 90 CAPSULE | Refills: 0 | Status: SHIPPED | OUTPATIENT
Start: 2020-08-25 | End: 2020-09-30

## 2020-08-25 RX ORDER — FLUCONAZOLE 150 MG/1
150 TABLET ORAL ONCE
Qty: 1 TABLET | Refills: 2 | Status: SHIPPED | OUTPATIENT
Start: 2020-08-25 | End: 2020-08-25

## 2020-08-25 NOTE — PATIENT INSTRUCTIONS
Urinary Tract Infection, Adult    A urinary tract infection (UTI) is an infection of any part of the urinary tract. The urinary tract includes the kidneys, ureters, bladder, and urethra. These organs make, store, and get rid of urine in the body.  Your health care provider may use other names to describe the infection. An upper UTI affects the ureters and kidneys (pyelonephritis). A lower UTI affects the bladder (cystitis) and urethra (urethritis).  What are the causes?  Most urinary tract infections are caused by bacteria in your genital area, around the entrance to your urinary tract (urethra). These bacteria grow and cause inflammation of your urinary tract.  What increases the risk?  You are more likely to develop this condition if:  · You have a urinary catheter that stays in place (indwelling).  · You are not able to control when you urinate or have a bowel movement (you have incontinence).  · You are female and you:  ? Use a spermicide or diaphragm for birth control.  ? Have low estrogen levels.  ? Are pregnant.  · You have certain genes that increase your risk (genetics).  · You are sexually active.  · You take antibiotic medicines.  · You have a condition that causes your flow of urine to slow down, such as:  ? An enlarged prostate, if you are male.  ? Blockage in your urethra (stricture).  ? A kidney stone.  ? A nerve condition that affects your bladder control (neurogenic bladder).  ? Not getting enough to drink, or not urinating often.  · You have certain medical conditions, such as:  ? Diabetes.  ? A weak disease-fighting system (immunesystem).  ? Sickle cell disease.  ? Gout.  ? Spinal cord injury.  What are the signs or symptoms?  Symptoms of this condition include:  · Needing to urinate right away (urgently).  · Frequent urination or passing small amounts of urine frequently.  · Pain or burning with urination.  · Blood in the urine.  · Urine that smells bad or unusual.  · Trouble urinating.  · Cloudy  urine.  · Vaginal discharge, if you are female.  · Pain in the abdomen or the lower back.  You may also have:  · Vomiting or a decreased appetite.  · Confusion.  · Irritability or tiredness.  · A fever.  · Diarrhea.  The first symptom in older adults may be confusion. In some cases, they may not have any symptoms until the infection has worsened.  How is this diagnosed?  This condition is diagnosed based on your medical history and a physical exam. You may also have other tests, including:  · Urine tests.  · Blood tests.  · Tests for sexually transmitted infections (STIs).  If you have had more than one UTI, a cystoscopy or imaging studies may be done to determine the cause of the infections.  How is this treated?  Treatment for this condition includes:  · Antibiotic medicine.  · Over-the-counter medicines to treat discomfort.  · Drinking enough water to stay hydrated.  If you have frequent infections or have other conditions such as a kidney stone, you may need to see a health care provider who specializes in the urinary tract (urologist).  In rare cases, urinary tract infections can cause sepsis. Sepsis is a life-threatening condition that occurs when the body responds to an infection. Sepsis is treated in the hospital with IV antibiotics, fluids, and other medicines.  Follow these instructions at home:    Medicines  · Take over-the-counter and prescription medicines only as told by your health care provider.  · If you were prescribed an antibiotic medicine, take it as told by your health care provider. Do not stop using the antibiotic even if you start to feel better.  General instructions  · Make sure you:  ? Empty your bladder often and completely. Do not hold urine for long periods of time.  ? Empty your bladder after sex.  ? Wipe from front to back after a bowel movement if you are female. Use each tissue one time when you wipe.  · Drink enough fluid to keep your urine pale yellow.  · Keep all follow-up  visits as told by your health care provider. This is important.  Contact a health care provider if:  · Your symptoms do not get better after 1-2 days.  · Your symptoms go away and then return.  Get help right away if you have:  · Severe pain in your back or your lower abdomen.  · A fever.  · Nausea or vomiting.  Summary  · A urinary tract infection (UTI) is an infection of any part of the urinary tract, which includes the kidneys, ureters, bladder, and urethra.  · Most urinary tract infections are caused by bacteria in your genital area, around the entrance to your urinary tract (urethra).  · Treatment for this condition often includes antibiotic medicines.  · If you were prescribed an antibiotic medicine, take it as told by your health care provider. Do not stop using the antibiotic even if you start to feel better.  · Keep all follow-up visits as told by your health care provider. This is important.  This information is not intended to replace advice given to you by your health care provider. Make sure you discuss any questions you have with your health care provider.  Document Released: 09/27/2006 Document Revised: 12/05/2019 Document Reviewed: 06/27/2019  [employer] Patient Education © 2020 [employer]    Start Prozac 20mg and lower dose of Lexapro to 1/2 tab first 4 days then 1/4 tab for 3 days and then stop

## 2020-08-25 NOTE — PROGRESS NOTES
The ABCs of the Annual Wellness Visit  Initial Medicare Wellness Visit    Chief Complaint   Patient presents with   • Anxiety   • Medicare Wellness-subsequent       Subjective   History of Present Illness:  Cortney White is a 30 y.o. female who presents for an Initial Medicare Wellness Visit.    HEALTH RISK ASSESSMENT    Recent Hospitalizations:  No hospitalization(s) within the last year.  Has been to ER    Current Medical Providers:  Patient Care Team:  Rosalie Ventura PA-C as PCP - General (Family Medicine)  Greg Cortés MD as PCP - Claims Attributed  Gio Doty MD as Consulting Physician (Gastroenterology)  nAa Graf MD as Consulting Physician (Obstetrics and Gynecology)  Greg Cortés MD (Pain Medicine)  Murali Shrestha MD as Consulting Physician (Otolaryngology)  Virgen Lares MD as Consulting Physician (Colon and Rectal Surgery)  Jennifer Roman PA as Referring Physician (Gastroenterology)  Alex Scott MD PhD as Consulting Physician (Hematology and Oncology)    Smoking Status:  Social History     Tobacco Use   Smoking Status Never Smoker   Smokeless Tobacco Never Used       Alcohol Consumption:  Social History     Substance and Sexual Activity   Alcohol Use No       Depression Screen:   No flowsheet data found.    Fall Risk Screen:  STEADI Fall Risk Assessment has not been completed.    Health Habits and Functional and Cognitive Screening:  No flowsheet data found.      Does the patient have evidence of cognitive impairment? No    Asprin use counseling:Does not need ASA (and currently is not on it)    Age-appropriate Screening Schedule:  Refer to the list below for future screening recommendations based on patient's age, sex and/or medical conditions. Orders for these recommended tests are listed in the plan section. The patient has been provided with a written plan.    Health Maintenance   Topic Date Due   • TDAP/TD VACCINES (2 - Td) 03/28/2020   • INFLUENZA VACCINE   08/01/2020   • PAP SMEAR  07/01/2022          The following portions of the patient's history were reviewed and updated as appropriate: allergies, current medications, past family history, past medical history, past social history, past surgical history and problem list.    Outpatient Medications Prior to Visit   Medication Sig Dispense Refill   • acetaminophen-codeine (TYLENOL #3) 300-30 MG per tablet      • cetirizine (zyrTEC) 10 MG tablet Take 10 mg by mouth.     • cholecalciferol (VITAMIN D3) 1000 units tablet Take 2,000 Units by mouth Daily.     • cyanocobalamin 1000 MCG/ML injection Inject 1,000 mcg into the appropriate muscle as directed by prescriber.     • escitalopram (LEXAPRO) 20 MG tablet 1 PO daily for stress (new dose) 90 tablet 0   • ferrous sulfate 325 (65 FE) MG tablet Take 325 mg by mouth Daily With Breakfast.     • loperamide (IMODIUM) 2 MG capsule TK 2 TO 3 CS PO QID 1/2 HOUR BEFORE MEALS AND 1/2 HOUR BEFORE BEDTIME     • Multiple Vitamin (MULTI-VITAMIN DAILY PO) Take  by mouth.     • ondansetron ODT (ZOFRAN-ODT) 4 MG disintegrating tablet Take 4 mg by mouth Every 8 (Eight) Hours As Needed for Nausea or Vomiting.     • pantoprazole (PROTONIX) 40 MG EC tablet Take 40 mg by mouth Daily.     • phenazopyridine (Pyridium) 200 MG tablet Take 1 tablet by mouth 3 (Three) Times a Day As Needed for Bladder Spasms for up to 2 days. 6 tablet 0   • SUMAtriptan (IMITREX) 50 MG tablet Take one tablet at onset of headache. May repeat dose one time in 2 hours if headache not relieved. 9 tablet 5   • UNITHROID 75 MCG tablet Take 1 tablet by mouth Daily. 90 tablet 1   • Ustekinumab (STELARA) 90 MG/ML solution prefilled syringe Injection      • VASCEPA 1 g capsule capsule Take 2 g by mouth 2 (Two) Times a Day With Meals. For trigs 120 capsule 11   • Vedolizumab (ENTYVIO IV) Infuse  into a venous catheter.     • vedolizumab (ENTYVIO) 300 MG injection      • vitamin D (ERGOCALCIFEROL) 1.25 MG (59428 UT) capsule  "capsule TAKE 1 CAPSULE BY MOUTH 2 TIMES A WEEK 26 capsule 3   • ampicillin (PRINCIPEN) 500 MG capsule Take 1 capsule by mouth 4 (Four) Times a Day for 7 days. 28 capsule 0   • azithromycin (ZITHROMAX Z-KRISSY) 250 MG tablet Take 2 tablets the first day, then 1 tablet daily for 4 days. 6 tablet 0   • LORazepam (ATIVAN) 0.5 MG tablet TK 1 T PO  TID  2     No facility-administered medications prior to visit.        Patient Active Problem List   Diagnosis   • Anxiety   • Moderate episode of recurrent major depressive disorder (CMS/HCC)   • Low serum cortisol level (CMS/HCC)   • Iron deficiency   • H/O total colectomy   • Migraine with aura and without status migrainosus, not intractable   • Crohn's disease (CMS/HCC)   • Intolerant of heat   • Chronic fatigue   • Vitamin D deficiency   • Hyperglycemia   • Dyslipidemia   • Primary hypothyroidism   • Abdominal pain   • B12 deficiency   • Chronic pain disorder   • Exacerbation of Crohn's disease of small intestine with complication (CMS/HCC)   • H/O iron deficiency   • Numbness and tingling in both hands   • Parastomal hernia   • Severe episode of recurrent major depressive disorder, without psychotic features (CMS/HCC)   • LFT elevation   • Abnormal weight gain   • Vaginal candida       Advanced Care Planning:  ACP discussion was held with the patient during this visit. Patient does not have an advance directive, declines further assistance.    Review of Systems    Compared to one year ago, the patient feels her physical health is the same.  Compared to one year ago, the patient feels her mental health is the same.    Reviewed chart for potential of high risk medication in the elderly: yes  Reviewed chart for potential of harmful drug interactions in the elderly:yes    Objective         Vitals:    08/25/20 1023   Height: 165.1 cm (65\")       Body mass index is 31.35 kg/m².  Discussed the patient's BMI with her. The BMI is above average; BMI management plan is " completed.    Physical Exam    Lab Results   Component Value Date    GLU 95 08/18/2020    CHLPL 196 08/18/2020    TRIG 322 (H) 08/18/2020    HDL 40 08/18/2020    LDL 92 08/18/2020    VLDL 64.4 08/18/2020    HGBA1C 5.30 08/18/2020        Assessment/Plan   Medicare Risks and Personalized Health Plan  CMS Preventative Services Quick Reference  Inactivity/Sedentary  Polypharmacy    The above risks/problems have been discussed with the patient.  Pertinent information has been shared with the patient in the After Visit Summary.  Follow up plans and orders are seen below in the Assessment/Plan Section.    There are no diagnoses linked to this encounter.  Follow Up:  No follow-ups on file.     An After Visit Summary and PPPS were given to the patient.

## 2020-08-25 NOTE — PROGRESS NOTES
"Subjective   Cortney White is a 30 y.o. female.     History of Present Illness     Cortney White 30 y.o.female complains of urinary symptoms. she complains of dysuria, urgency, frequency and lower abdominal pain. She has had symptoms for several weeks. The symptoms are moderate.  Patient denies fever, gross blood in urine, nausea, vomiting, possible pregnancy and risk of STD.  Patient has tried  OTC bladder analgesic and had Macrobid and now on Ampicillin  for relief of symptoms.  Patient does not have a history of recurrent UTI. Patient does not have a history of pyelonephritis.    Prior urine showed UTI early August  ER Rx Macrobid 8-3-2020  Dr Antoine did telephone visit. Started ampicillin and only some help. Still burn, frequent, urgent; also some itching  Need to treat yeast infx    Had labs for endocrine--trigs 322--on Vasecpa  ACTH low but cortisol normal  LFT elevation---GI sees her for this    Still having depression and is on Lexapro.   She wants to try Prozac  Cortney White female 30 y.o., /68 (BP Location: Left arm, Patient Position: Sitting, Cuff Size: Large Adult)   Pulse 73   Temp 97.2 °F (36.2 °C) (Skin)   Resp 16   Ht 165.1 cm (65\")   Wt 88.5 kg (195 lb)   SpO2 96%   BMI 32.45 kg/m²   who presents today for follow up of Depression and Anxiety.  She reports not much help with Lexapro.  Still having to take Ativan--I cannot Rx and see psych if cont need.   Tried Lexapro at 30mg and no help. Having daily depression and lacks motivation. Wants to try Prozac and agree.. Onset of symptoms was approximately several years ago.  She denies current suicidal and homicidal ideation. Risk factors are family history of anxiety and or depression, lifestyle of multiple roles and chronic illness.  Previous treatment includes current Rx.  She complains of the following medication side effects: none.  The patient has previously been in counseling..    PHQ-9 Depression Screening  Little " interest or pleasure in doing things? 0   Feeling down, depressed, or hopeless? 0   Trouble falling or staying asleep, or sleeping too much? 3   Feeling tired or having little energy? 3   Poor appetite or overeating? 0   Feeling bad about yourself - or that you are a failure or have let yourself or your family down? 0   Trouble concentrating on things, such as reading the newspaper or watching television? 0   Moving or speaking so slowly that other people could have noticed? Or the opposite - being so fidgety or restless that you have been moving around a lot more than usual? 1   Thoughts that you would be better off dead, or of hurting yourself in some way? 0   PHQ-9 Total Score 7   If you checked off any problems, how difficult have these problems made it for you to do your work, take care of things at home, or get along with other people? Somewhat difficult       The following portions of the patient's history were reviewed and updated as appropriate: allergies, current medications, past family history, past medical history, past social history, past surgical history and problem list.    Review of Systems   Constitutional: Positive for fatigue. Negative for activity change, appetite change and unexpected weight change.   HENT: Negative for nosebleeds and trouble swallowing.    Eyes: Negative for pain and visual disturbance.   Respiratory: Negative for chest tightness, shortness of breath and wheezing.    Cardiovascular: Negative for chest pain and palpitations.   Gastrointestinal: Negative for abdominal pain and blood in stool.   Endocrine: Negative.    Genitourinary: Positive for dysuria, frequency and urgency. Negative for difficulty urinating and hematuria.   Musculoskeletal: Negative for joint swelling.   Skin: Negative for color change and rash.   Allergic/Immunologic: Negative.    Neurological: Negative for syncope and speech difficulty.   Hematological: Negative for adenopathy.   Psychiatric/Behavioral:  Positive for dysphoric mood. Negative for agitation and confusion. The patient is nervous/anxious.    All other systems reviewed and are negative.      Objective   Physical Exam   Constitutional: She is oriented to person, place, and time. She appears well-developed and well-nourished. No distress.   HENT:   Head: Normocephalic and atraumatic.   Eyes: Pupils are equal, round, and reactive to light. Conjunctivae and EOM are normal. Right eye exhibits no discharge. Left eye exhibits no discharge. No scleral icterus.   Neck: Normal range of motion. Neck supple. No tracheal deviation present. No thyromegaly present.   Cardiovascular: Normal rate, regular rhythm, normal heart sounds, intact distal pulses and normal pulses. Exam reveals no gallop.   No murmur heard.  Pulmonary/Chest: Effort normal and breath sounds normal. No respiratory distress. She has no wheezes. She has no rales.   Abdominal: Soft. There is tenderness. A hernia (ostomy hernia) is present.   Musculoskeletal: Normal range of motion.   Neurological: She is alert and oriented to person, place, and time. She exhibits normal muscle tone. Coordination normal.   Skin: Skin is warm. No rash noted. No erythema. No pallor.   Psychiatric: She has a normal mood and affect. Her behavior is normal. Judgment and thought content normal.   Nursing note and vitals reviewed.      Assessment/Plan   Cortney was seen today for anxiety and medicare wellness-subsequent.    Diagnoses and all orders for this visit:    Dysuria  -     Urine Culture - Urine, Urine, Clean Catch  -     Urinalysis With Microscopic - Urine, Clean Catch  -     POC Urinalysis Dipstick, Automated    Yeast infection  -     Urine Culture - Urine, Urine, Clean Catch  -     Urinalysis With Microscopic - Urine, Clean Catch  -     POC Urinalysis Dipstick, Automated    Anxiety  -     Urine Culture - Urine, Urine, Clean Catch  -     Urinalysis With Microscopic - Urine, Clean Catch  -     POC Urinalysis  Dipstick, Automated    Moderate episode of recurrent major depressive disorder (CMS/HCC)  -     Urine Culture - Urine, Urine, Clean Catch  -     Urinalysis With Microscopic - Urine, Clean Catch  -     POC Urinalysis Dipstick, Automated    Other orders  -     fluconazole (Diflucan) 150 MG tablet; Take 1 tablet by mouth 1 (One) Time for 1 dose. One PO X 1 for yeast infection  -     FLUoxetine (PROzac) 20 MG capsule; One PO daily for stress        Get urine studies  Start Prozac 20mg and lower dose of Lexapro to 1/2 tab first 4 days then 1/4 tab for 3 days and then stop  F/u GI and f/u endocrine  ? If may have IC?

## 2020-08-26 ENCOUNTER — RESULTS ENCOUNTER (OUTPATIENT)
Dept: FAMILY MEDICINE CLINIC | Facility: CLINIC | Age: 30
End: 2020-08-26

## 2020-08-26 DIAGNOSIS — N30.01 ACUTE CYSTITIS WITH HEMATURIA: ICD-10-CM

## 2020-08-27 LAB
APPEARANCE UR: CLEAR
BACTERIA #/AREA URNS HPF: NORMAL /[HPF]
BACTERIA UR CULT: NO GROWTH
BACTERIA UR CULT: NORMAL
BILIRUB UR QL STRIP: NEGATIVE
COLOR UR: YELLOW
EPI CELLS #/AREA URNS HPF: NORMAL /HPF (ref 0–10)
GLUCOSE UR QL: NEGATIVE
HGB UR QL STRIP: NEGATIVE
KETONES UR QL STRIP: NEGATIVE
LEUKOCYTE ESTERASE UR QL STRIP: NEGATIVE
Lab: NORMAL
MICRO URNS: NORMAL
MICRO URNS: NORMAL
MUCOUS THREADS URNS QL MICRO: PRESENT
NITRITE UR QL STRIP: NEGATIVE
PH UR STRIP: 6 [PH] (ref 5–7.5)
PROT UR QL STRIP: NORMAL
RBC #/AREA URNS HPF: NORMAL /HPF (ref 0–2)
SP GR UR: 1.02 (ref 1–1.03)
UROBILINOGEN UR STRIP-MCNC: 0.2 MG/DL (ref 0.2–1)
WBC #/AREA URNS HPF: NORMAL /HPF (ref 0–5)

## 2020-09-03 ENCOUNTER — TELEPHONE (OUTPATIENT)
Dept: FAMILY MEDICINE CLINIC | Facility: CLINIC | Age: 30
End: 2020-09-03

## 2020-09-03 DIAGNOSIS — F51.4 NIGHT TERRORS: Primary | ICD-10-CM

## 2020-09-03 DIAGNOSIS — G47.30 OBSERVED SLEEP APNEA: ICD-10-CM

## 2020-09-03 NOTE — TELEPHONE ENCOUNTER
Has not started Prozac yet - still taking Lexapro.    Having night terrors and waking up with SOA x 1 week.  Pt is asking if she needs sleep study.

## 2020-09-30 ENCOUNTER — OFFICE VISIT (OUTPATIENT)
Dept: FAMILY MEDICINE CLINIC | Facility: CLINIC | Age: 30
End: 2020-09-30

## 2020-09-30 VITALS
HEART RATE: 87 BPM | HEIGHT: 65 IN | OXYGEN SATURATION: 96 % | SYSTOLIC BLOOD PRESSURE: 114 MMHG | DIASTOLIC BLOOD PRESSURE: 68 MMHG | BODY MASS INDEX: 32.82 KG/M2 | TEMPERATURE: 97.3 F | RESPIRATION RATE: 16 BRPM | WEIGHT: 197 LBS

## 2020-09-30 DIAGNOSIS — F33.1 MODERATE EPISODE OF RECURRENT MAJOR DEPRESSIVE DISORDER (HCC): ICD-10-CM

## 2020-09-30 DIAGNOSIS — K50.018 CROHN'S DISEASE OF SMALL INTESTINE WITH OTHER COMPLICATION (HCC): ICD-10-CM

## 2020-09-30 DIAGNOSIS — F41.9 ANXIETY: Primary | ICD-10-CM

## 2020-09-30 PROCEDURE — 99213 OFFICE O/P EST LOW 20 MIN: CPT | Performed by: PHYSICIAN ASSISTANT

## 2020-09-30 RX ORDER — ESCITALOPRAM OXALATE 20 MG/1
TABLET ORAL
Qty: 90 TABLET | Refills: 3 | Status: SHIPPED | OUTPATIENT
Start: 2020-09-30

## 2020-09-30 NOTE — PROGRESS NOTES
"Subjective   Cortney White is a 30 y.o. female.     History of Present Illness   Cortney White female 30 y.o., /68 (BP Location: Left arm, Patient Position: Sitting, Cuff Size: Large Adult)   Pulse 87   Temp 97.3 °F (36.3 °C) (Temporal)   Resp 16   Ht 165.1 cm (65\")   Wt 89.4 kg (197 lb)   SpO2 96%   BMI 32.78 kg/m²   who presents today for follow-up from switching Lexapro to Prozac. For her known history of anxiety and depression.  Was trying to wean her off her benzodiazepine by better controlling her anxiety.  She took 1 dose of Prozac and it made her feel very anxious in the seated.  She is back on the Lexapro and still taking her Ativan as needed from her other provider.  She does not want to try different SSRI at this time and I have advised her to see psychiatry about her medication regimen.  She reports Lexapro does help but she still has breakthrough anxiety most days and can have depression. Onset of symptoms was approximately several years ago.  She denies current suicidal and homicidal ideation. Risk factors are lifestyle of multiple roles and chronic illness.  Previous treatment includes back on Lexapro.  She complains of the following medication side effects: none.  The patient has previously been in counseling..    She notes her mom is on Zoloft---she wants to wait on changing.     Tried one dose Prozac---felt more anxiety  She is still taking Lexapro  Had labs for endocrine--trigs 322--on Vasecpa  ACTH low but cortisol normal  LFT elevation---GI sees her for this      Did see Dr Smith --urologist; has bladder spasms and has PRN Ditropan    Not exercising;    The following portions of the patient's history were reviewed and updated as appropriate: allergies, current medications, past family history, past medical history, past social history, past surgical history and problem list.    Review of Systems   Constitutional: Positive for fatigue. Negative for activity change, " appetite change and unexpected weight change.   HENT: Negative for nosebleeds and trouble swallowing.    Eyes: Negative for pain and visual disturbance.   Respiratory: Negative for chest tightness, shortness of breath and wheezing.    Cardiovascular: Negative for chest pain and palpitations.   Gastrointestinal: Negative for abdominal pain and blood in stool.   Endocrine: Negative.    Genitourinary: Negative for difficulty urinating, dysuria and hematuria.   Musculoskeletal: Negative for joint swelling.   Skin: Negative for color change and rash.   Allergic/Immunologic: Negative.    Neurological: Negative for syncope and speech difficulty.   Hematological: Negative for adenopathy.   Psychiatric/Behavioral: Positive for dysphoric mood. Negative for agitation and confusion. The patient is nervous/anxious.    All other systems reviewed and are negative.      Objective   Physical Exam  Vitals signs and nursing note reviewed.   Constitutional:       General: She is not in acute distress.     Appearance: Normal appearance. She is well-developed. She is not diaphoretic.   HENT:      Head: Normocephalic.      Nose: Nose normal.   Eyes:      Conjunctiva/sclera: Conjunctivae normal.      Pupils: Pupils are equal, round, and reactive to light.   Neck:      Musculoskeletal: Normal range of motion and neck supple.      Vascular: No carotid bruit.   Cardiovascular:      Rate and Rhythm: Normal rate and regular rhythm.      Heart sounds: Normal heart sounds. No murmur.   Pulmonary:      Effort: Pulmonary effort is normal.      Breath sounds: Normal breath sounds.   Musculoskeletal: Normal range of motion.   Skin:     General: Skin is warm and dry.      Coloration: Skin is not jaundiced.      Findings: No rash.   Neurological:      General: No focal deficit present.      Mental Status: She is alert and oriented to person, place, and time. Mental status is at baseline.   Psychiatric:         Mood and Affect: Affect is not  inappropriate.         Behavior: Behavior normal.         Thought Content: Thought content normal.         Judgment: Judgment normal.         Assessment/Plan   Diagnoses and all orders for this visit:    Anxiety    Moderate episode of recurrent major depressive disorder (CMS/HCC)    Crohn's disease of small intestine with other complication (CMS/HCC)    Other orders  -     escitalopram (LEXAPRO) 20 MG tablet; 1 PO daily for stress        Ok to stay on Lexapro for now; considering Zoloft  Has Ativan PRN anxiety  Sees GI  Sees endocrine  Sees urologist

## 2020-09-30 NOTE — PATIENT INSTRUCTIONS
Escitalopram tablets  What is this medicine?  ESCITALOPRAM (es sye SALLY oh pram) is used to treat depression and certain types of anxiety.  This medicine may be used for other purposes; ask your health care provider or pharmacist if you have questions.  COMMON BRAND NAME(S): Lexapro  What should I tell my health care provider before I take this medicine?  They need to know if you have any of these conditions:  · bipolar disorder or a family history of bipolar disorder  · diabetes  · glaucoma  · heart disease  · kidney or liver disease  · receiving electroconvulsive therapy  · seizures (convulsions)  · suicidal thoughts, plans, or attempt by you or a family member  · an unusual or allergic reaction to escitalopram, the related drug citalopram, other medicines, foods, dyes, or preservatives  · pregnant or trying to become pregnant  · breast-feeding  How should I use this medicine?  Take this medicine by mouth with a glass of water. Follow the directions on the prescription label. You can take it with or without food. If it upsets your stomach, take it with food. Take your medicine at regular intervals. Do not take it more often than directed. Do not stop taking this medicine suddenly except upon the advice of your doctor. Stopping this medicine too quickly may cause serious side effects or your condition may worsen.  A special MedGuide will be given to you by the pharmacist with each prescription and refill. Be sure to read this information carefully each time.  Talk to your pediatrician regarding the use of this medicine in children. Special care may be needed.  Overdosage: If you think you have taken too much of this medicine contact a poison control center or emergency room at once.  NOTE: This medicine is only for you. Do not share this medicine with others.  What if I miss a dose?  If you miss a dose, take it as soon as you can. If it is almost time for your next dose, take only that dose. Do not take double or  extra doses.  What may interact with this medicine?  Do not take this medicine with any of the following medications:  · certain medicines for fungal infections like fluconazole, itraconazole, ketoconazole, posaconazole, voriconazole  · cisapride  · citalopram  · dronedarone  · linezolid  · MAOIs like Carbex, Eldepryl, Marplan, Nardil, and Parnate  · methylene blue (injected into a vein)  · pimozide  · thioridazine  This medicine may also interact with the following medications:  · alcohol  · amphetamines  · aspirin and aspirin-like medicines  · carbamazepine  · certain medicines for depression, anxiety, or psychotic disturbances  · certain medicines for migraine headache like almotriptan, eletriptan, frovatriptan, naratriptan, rizatriptan, sumatriptan, zolmitriptan  · certain medicines for sleep  · certain medicines that treat or prevent blood clots like warfarin, enoxaparin, dalteparin  · cimetidine  · diuretics  · dofetilide  · fentanyl  · furazolidone  · isoniazid  · lithium  · metoprolol  · NSAIDs, medicines for pain and inflammation, like ibuprofen or naproxen  · other medicines that prolong the QT interval (cause an abnormal heart rhythm)  · procarbazine  · rasagiline  · supplements like [place] wort, kava kava, valerian  · tramadol  · tryptophan  · ziprasidone  This list may not describe all possible interactions. Give your health care provider a list of all the medicines, herbs, non-prescription drugs, or dietary supplements you use. Also tell them if you smoke, drink alcohol, or use illegal drugs. Some items may interact with your medicine.  What should I watch for while using this medicine?  Tell your doctor if your symptoms do not get better or if they get worse. Visit your doctor or health care professional for regular checks on your progress. Because it may take several weeks to see the full effects of this medicine, it is important to continue your treatment as prescribed by your doctor.  Patients  and their families should watch out for new or worsening thoughts of suicide or depression. Also watch out for sudden changes in feelings such as feeling anxious, agitated, panicky, irritable, hostile, aggressive, impulsive, severely restless, overly excited and hyperactive, or not being able to sleep. If this happens, especially at the beginning of treatment or after a change in dose, call your health care professional.  You may get drowsy or dizzy. Do not drive, use machinery, or do anything that needs mental alertness until you know how this medicine affects you. Do not stand or sit up quickly, especially if you are an older patient. This reduces the risk of dizzy or fainting spells. Alcohol may interfere with the effect of this medicine. Avoid alcoholic drinks.  Your mouth may get dry. Chewing sugarless gum or sucking hard candy, and drinking plenty of water may help. Contact your doctor if the problem does not go away or is severe.  What side effects may I notice from receiving this medicine?  Side effects that you should report to your doctor or health care professional as soon as possible:  · allergic reactions like skin rash, itching or hives, swelling of the face, lips, or tongue  · anxious  · black, tarry stools  · changes in vision  · confusion  · elevated mood, decreased need for sleep, racing thoughts, impulsive behavior  · eye pain  · fast, irregular heartbeat  · feeling faint or lightheaded, falls  · feeling agitated, angry, or irritable  · hallucination, loss of contact with reality  · loss of balance or coordination  · loss of memory  · painful or prolonged erections  · restlessness, pacing, inability to keep still  · seizures  · stiff muscles  · suicidal thoughts or other mood changes  · trouble sleeping  · unusual bleeding or bruising  · unusually weak or tired  · vomiting  Side effects that usually do not require medical attention (report to your doctor or health care professional if they  continue or are bothersome):  · changes in appetite  · change in sex drive or performance  · headache  · increased sweating  · indigestion, nausea  · tremors  This list may not describe all possible side effects. Call your doctor for medical advice about side effects. You may report side effects to FDA at 3-542-QNS-5242.  Where should I keep my medicine?  Keep out of reach of children.  Store at room temperature between 15 and 30 degrees C (59 and 86 degrees F). Throw away any unused medicine after the expiration date.  NOTE: This sheet is a summary. It may not cover all possible information. If you have questions about this medicine, talk to your doctor, pharmacist, or health care provider.  © 2020 [employer] (2019-12-09 11:21:44)

## 2020-10-02 RX ORDER — ICOSAPENT ETHYL 1000 MG/1
2 CAPSULE ORAL 2 TIMES DAILY WITH MEALS
Qty: 120 CAPSULE | Refills: 0 | Status: SHIPPED | OUTPATIENT
Start: 2020-10-02 | End: 2020-12-09 | Stop reason: SDUPTHER

## 2020-11-02 RX ORDER — LEVOTHYROXINE SODIUM 75 UG/1
75 TABLET ORAL DAILY
Qty: 30 TABLET | Refills: 0 | Status: SHIPPED | OUTPATIENT
Start: 2020-11-02 | End: 2020-12-09 | Stop reason: SDUPTHER

## 2020-11-20 RX ORDER — ICOSAPENT ETHYL 1000 MG/1
CAPSULE ORAL
Qty: 120 CAPSULE | Refills: 0 | OUTPATIENT
Start: 2020-11-20

## 2020-12-02 RX ORDER — ICOSAPENT ETHYL 1000 MG/1
CAPSULE ORAL
Qty: 120 CAPSULE | Refills: 0 | OUTPATIENT
Start: 2020-12-02

## 2020-12-09 ENCOUNTER — TELEPHONE (OUTPATIENT)
Dept: ENDOCRINOLOGY | Age: 30
End: 2020-12-09

## 2020-12-09 RX ORDER — LEVOTHYROXINE SODIUM 75 UG/1
75 TABLET ORAL DAILY
Qty: 30 TABLET | Refills: 2 | Status: SHIPPED | OUTPATIENT
Start: 2020-12-09 | End: 2021-04-19 | Stop reason: SDUPTHER

## 2020-12-09 RX ORDER — ICOSAPENT ETHYL 1000 MG/1
2 CAPSULE ORAL 2 TIMES DAILY WITH MEALS
Qty: 120 CAPSULE | Refills: 2 | Status: SHIPPED | OUTPATIENT
Start: 2020-12-09

## 2020-12-24 RX ORDER — SUMATRIPTAN 50 MG/1
TABLET, FILM COATED ORAL
Qty: 9 TABLET | Refills: 5 | Status: SHIPPED | OUTPATIENT
Start: 2020-12-24

## 2021-02-05 DIAGNOSIS — E78.5 DYSLIPIDEMIA: ICD-10-CM

## 2021-02-05 DIAGNOSIS — R79.89 LFT ELEVATION: ICD-10-CM

## 2021-02-05 DIAGNOSIS — R79.89 LOW SERUM CORTISOL LEVEL: Primary | ICD-10-CM

## 2021-02-05 DIAGNOSIS — E03.9 PRIMARY HYPOTHYROIDISM: ICD-10-CM

## 2021-02-05 DIAGNOSIS — R68.89 INTOLERANT OF HEAT: ICD-10-CM

## 2021-02-05 DIAGNOSIS — R73.9 HYPERGLYCEMIA: ICD-10-CM

## 2021-02-05 DIAGNOSIS — E55.9 VITAMIN D DEFICIENCY: ICD-10-CM

## 2021-02-05 DIAGNOSIS — R53.82 CHRONIC FATIGUE: ICD-10-CM

## 2021-02-23 RX ORDER — LORAZEPAM 0.5 MG/1
TABLET ORAL
Refills: 2 | Status: CANCELLED | OUTPATIENT
Start: 2021-02-23

## 2021-02-26 RX ORDER — ERGOCALCIFEROL 1.25 MG/1
50000 CAPSULE ORAL 2 TIMES WEEKLY
Qty: 26 CAPSULE | Refills: 0 | Status: SHIPPED | OUTPATIENT
Start: 2021-03-01

## 2021-04-02 RX ORDER — LEVOTHYROXINE SODIUM 75 UG/1
TABLET ORAL
Qty: 30 TABLET | Refills: 2 | OUTPATIENT
Start: 2021-04-02

## 2021-04-20 RX ORDER — LEVOTHYROXINE SODIUM 75 UG/1
75 TABLET ORAL DAILY
Qty: 90 TABLET | Refills: 0 | Status: SHIPPED | OUTPATIENT
Start: 2021-04-20

## 2021-04-21 ENCOUNTER — TELEPHONE (OUTPATIENT)
Dept: FAMILY MEDICINE CLINIC | Facility: CLINIC | Age: 31
End: 2021-04-21

## 2021-04-21 NOTE — TELEPHONE ENCOUNTER
"“Please be informed that patient has passed. Patient has been marked  in the system. The date of death is: 2021\".    Caller: Jolene Bhatia    Relationship: Mother    Best call back number: [phone]    PATIENT'S MOTHER ALSO ASKED HILARY TUYET TO GIVE HER A CALL AS SHE WANTED TO THANK HER FOR THE SERVICE SHE PROVIDED THE PATIENT  "

## 2021-04-22 NOTE — TELEPHONE ENCOUNTER
I returned call. Mother wanted to thank you personally if you ever have a second to call her she states she really appreciates all you did for her daughter.  Thanks